# Patient Record
Sex: MALE | Race: WHITE | NOT HISPANIC OR LATINO | URBAN - METROPOLITAN AREA
[De-identification: names, ages, dates, MRNs, and addresses within clinical notes are randomized per-mention and may not be internally consistent; named-entity substitution may affect disease eponyms.]

---

## 2020-10-06 ENCOUNTER — TELEMEDICINE (OUTPATIENT)
Dept: FAMILY MEDICINE CLINIC | Facility: CLINIC | Age: 35
End: 2020-10-06
Payer: COMMERCIAL

## 2020-10-06 VITALS — BODY MASS INDEX: 21.47 KG/M2 | TEMPERATURE: 98.1 F | WEIGHT: 150 LBS | HEIGHT: 70 IN

## 2020-10-06 DIAGNOSIS — K21.9 GASTROESOPHAGEAL REFLUX DISEASE, UNSPECIFIED WHETHER ESOPHAGITIS PRESENT: ICD-10-CM

## 2020-10-06 DIAGNOSIS — G98.8 NEUROLOGICAL DISORDER: ICD-10-CM

## 2020-10-06 DIAGNOSIS — R10.32 LEFT LOWER QUADRANT ABDOMINAL PAIN: ICD-10-CM

## 2020-10-06 DIAGNOSIS — K92.1 BLOOD IN STOOL: Primary | ICD-10-CM

## 2020-10-06 PROCEDURE — 1036F TOBACCO NON-USER: CPT | Performed by: NURSE PRACTITIONER

## 2020-10-06 PROCEDURE — 3725F SCREEN DEPRESSION PERFORMED: CPT | Performed by: NURSE PRACTITIONER

## 2020-10-06 PROCEDURE — 99203 OFFICE O/P NEW LOW 30 MIN: CPT | Performed by: NURSE PRACTITIONER

## 2020-10-06 RX ORDER — PANTOPRAZOLE SODIUM 40 MG/1
40 TABLET, DELAYED RELEASE ORAL DAILY
COMMUNITY
End: 2021-10-21 | Stop reason: ALTCHOICE

## 2020-10-06 RX ORDER — MULTIVITAMIN
1 CAPSULE ORAL DAILY
COMMUNITY

## 2021-03-10 DIAGNOSIS — Z23 ENCOUNTER FOR IMMUNIZATION: ICD-10-CM

## 2021-06-23 ENCOUNTER — OFFICE VISIT (OUTPATIENT)
Dept: FAMILY MEDICINE CLINIC | Facility: CLINIC | Age: 36
End: 2021-06-23
Payer: COMMERCIAL

## 2021-06-23 VITALS
HEIGHT: 70 IN | RESPIRATION RATE: 16 BRPM | WEIGHT: 160 LBS | SYSTOLIC BLOOD PRESSURE: 118 MMHG | OXYGEN SATURATION: 98 % | BODY MASS INDEX: 22.9 KG/M2 | HEART RATE: 64 BPM | DIASTOLIC BLOOD PRESSURE: 64 MMHG

## 2021-06-23 DIAGNOSIS — Z13.1 SCREENING FOR DIABETES MELLITUS: ICD-10-CM

## 2021-06-23 DIAGNOSIS — Z00.00 ANNUAL PHYSICAL EXAM: Primary | ICD-10-CM

## 2021-06-23 DIAGNOSIS — K21.9 GASTROESOPHAGEAL REFLUX DISEASE, UNSPECIFIED WHETHER ESOPHAGITIS PRESENT: ICD-10-CM

## 2021-06-23 DIAGNOSIS — R53.1 WEAKNESS: ICD-10-CM

## 2021-06-23 DIAGNOSIS — R25.1 TREMOR: ICD-10-CM

## 2021-06-23 DIAGNOSIS — Z13.6 SCREENING FOR CARDIOVASCULAR CONDITION: ICD-10-CM

## 2021-06-23 PROCEDURE — 99395 PREV VISIT EST AGE 18-39: CPT | Performed by: NURSE PRACTITIONER

## 2021-06-23 PROCEDURE — 3725F SCREEN DEPRESSION PERFORMED: CPT | Performed by: NURSE PRACTITIONER

## 2021-06-23 PROCEDURE — 3008F BODY MASS INDEX DOCD: CPT | Performed by: NURSE PRACTITIONER

## 2021-06-23 PROCEDURE — 1036F TOBACCO NON-USER: CPT | Performed by: NURSE PRACTITIONER

## 2021-06-23 NOTE — PROGRESS NOTES
ADULT ANNUAL [address]  [facility]    NAME: Rocky Asencio  AGE: 30 y o  SEX: male  : 1985     DATE: 2021     Assessment and Plan:     Problem List Items Addressed This Visit        Digestive    GERD (gastroesophageal reflux disease)  Continue to follow-up with GI  Other    Annual physical exam - Primary    Relevant Orders    Comprehensive metabolic panel    CBC and differential    TSH, 3rd generation with Free T4 reflex    Lipid Panel with Direct LDL reflex    Patient instructed to eat a healthy diverse diet  Tremor    Relevant Orders    Ambulatory referral to Neurology    Comprehensive metabolic panel    CBC and differential    TSH, 3rd generation with Free T4 reflex    Patient referred to [place] [id number] neurology for follow-up  Weakness    Relevant Orders    Ambulatory referral to Neurology    Comprehensive metabolic panel    CBC and differential    TSH, 3rd generation with Free T4 reflex    Patient referred to [place] [address] neurology for follow-up  Other Visit Diagnoses     Screening for diabetes mellitus        Relevant Orders    Comprehensive metabolic panel    Screening for cardiovascular condition        Relevant Orders    Lipid Panel with Direct LDL reflex          Immunizations and preventive care screenings were discussed with patient today  Appropriate education was printed on patient's after visit summary  Counseling:  Alcohol/drug use: discussed moderation in alcohol intake, the recommendations for healthy alcohol use, and avoidance of illicit drug use  Dental Health: discussed importance of regular tooth brushing, flossing, and dental visits  Injury prevention: discussed safety/seat belts, safety helmets, smoke detectors, carbon monoxide detectors,  · Exercise: the importance of regular physical activity was discussed  Lab work ordered  Patient instructed to check with insurance for coverage  Will follow-up results with the patient  Return in 1 year (on 6/23/2022)  Chief Complaint:     Chief Complaint   Patient presents with    Annual Exam      History of Present Illness:     Adult Annual Physical   Patient here for a comprehensive physical exam  Patient is here with his significant other  Patient follows up with GI for GERD  Patient reports that he takes protonix daily  Patient reports that has had tremor and weakness for the past 11 years since he was sick  Patient reports that he also has difficulty with his balance  Patient reports that he has seen multiple neurologists and no one was able to determine the cause  Patient would like a referral for a local neurologist      Diet and Physical Activity  · Diet/Nutrition: well balanced diet  · Exercise: strengthening exercises everyday         Depression Screening  PHQ-9 Depression Screening    PHQ-9:   Frequency of the following problems over the past two weeks:      Little interest or pleasure in doing things: 0 - not at all  Feeling down, depressed, or hopeless: 0 - not at all  PHQ-2 Score: 0       General Health  · Sleep: gets 7-8 hours of sleep on average  · Hearing: decreased - bilateral  Patient reports decreased hearing since he was sick 11 years ago  Patient refuses to see an audiologist    · Vision: no vision problems  · Dental: no dental visits for >1 year  [facility]  · History of STDs?: no      Review of Systems:     Review of Systems   Constitutional: Negative for appetite change, chills, fatigue and fever  HENT: Negative for congestion, ear pain, sore throat and trouble swallowing  Eyes: Negative for pain, discharge and redness  Respiratory: Negative for cough, chest tightness, shortness of breath and wheezing  Cardiovascular: Negative for chest pain, palpitations and leg swelling  Gastrointestinal: Negative for blood in stool, diarrhea, nausea and vomiting     Genitourinary: Negative for dysuria, frequency, hematuria, penile pain, testicular pain and urgency  Musculoskeletal: Negative for myalgias and neck pain  Skin: Negative for rash  Neurological: Positive for tremors and weakness  Negative for dizziness, seizures, syncope, light-headedness and headaches  Psychiatric/Behavioral: Negative for suicidal ideas  Denies any depression  Past Medical History:     Past Medical History:   Diagnosis Date    Neurologic abnormality       Past Surgical History:     History reviewed  No pertinent surgical history  Social History:     Social History     Socioeconomic History    Marital status: Single     Spouse name: None    Number of children: None    Years of education: None    Highest education level: None   Occupational History    None   Tobacco Use    Smoking status: Never Smoker    Smokeless tobacco: Never Used   Vaping Use    Vaping Use: Never used   Substance and Sexual Activity    Alcohol use: Never    Drug use: Never    Sexual activity: Not Currently   Other Topics Concern    None   Social History Narrative    None     Social Determinants of Health     Financial Resource Strain:     Difficulty of Paying Living Expenses:    Food Insecurity:     Worried About Running Out of Food in the Last Year:     Ran Out of Food in the Last Year:    Transportation Needs:     Lack of Transportation (Medical):      Lack of Transportation (Non-Medical):    Physical Activity:     Days of Exercise per Week:     Minutes of Exercise per Session:    Stress:     Feeling of Stress :    Social Connections:     Frequency of Communication with Friends and Family:     Frequency of Social Gatherings with Friends and Family:     Attends Temple Services:     Active Member of Clubs or Organizations:     Attends Club or Organization Meetings:     Marital Status:    Intimate Partner Violence:     Fear of Current or Ex-Partner:     Emotionally Abused:     Physically Abused:     Sexually Abused:       Family History:     Family History   Problem Relation Age of Onset    Irritable bowel syndrome Sister     Colon cancer Maternal Grandmother     Liver disease Paternal Uncle     Breast cancer Mother       Current Medications:     Current Outpatient Medications   Medication Sig Dispense Refill    Multiple Vitamin (multivitamin) capsule Take 1 capsule by mouth daily      pantoprazole (PROTONIX) 40 mg tablet Take 40 mg by mouth daily       No current facility-administered medications for this visit  Allergies:     No Known Allergies   Physical Exam:     /64   Pulse 60   Resp 16   Ht 5' 10" (1 778 m) Comment: as per significant other  Wt 72 6 kg (160 lb) Comment: as per significnat other, weighed at home  SpO2 98%   BMI 22 96 kg/m²     Physical Exam  Vitals reviewed  Constitutional:       General: He is not in acute distress  Appearance: He is not ill-appearing or diaphoretic  HENT:      Right Ear: Tympanic membrane, ear canal and external ear normal       Left Ear: Tympanic membrane, ear canal and external ear normal       Nose: Nose normal       Mouth/Throat:      Mouth: Mucous membranes are moist       Pharynx: Oropharynx is clear  No posterior oropharyngeal erythema  Eyes:      Conjunctiva/sclera: Conjunctivae normal       Pupils: Pupils are equal, round, and reactive to light  Cardiovascular:      Rate and Rhythm: Normal rate and regular rhythm  Pulses: Normal pulses  Heart sounds: Normal heart sounds  Comments: No edema noted  Pulmonary:      Effort: Pulmonary effort is normal  No respiratory distress  Breath sounds: Normal breath sounds  No wheezing  Abdominal:      General: There is no distension  Palpations: Abdomen is soft  There is no mass  Tenderness: There is no abdominal tenderness  Genitourinary:     Comments: Patient refuses a genital exam    Musculoskeletal:      Comments: Patient is in a wheelchair      Lymphadenopathy: Cervical: No cervical adenopathy  Skin:     Findings: No rash  Neurological:      Mental Status: He is alert and oriented to person, place, and time  Comments: Head tremor noted  Patient is in a wheelchair      Psychiatric:         Mood and Affect: Mood normal           George Bonilla 33

## 2021-06-23 NOTE — PATIENT INSTRUCTIONS

## 2021-07-07 ENCOUNTER — TELEPHONE (OUTPATIENT)
Dept: FAMILY MEDICINE CLINIC | Facility: CLINIC | Age: 36
End: 2021-07-07

## 2021-07-07 LAB
ALBUMIN SERPL-MCNC: 4.3 G/DL (ref 3.6–5.1)
ALBUMIN/GLOB SERPL: 2 (CALC) (ref 1–2.5)
ALP SERPL-CCNC: 76 U/L (ref 36–130)
ALT SERPL-CCNC: 25 U/L (ref 9–46)
AST SERPL-CCNC: 23 U/L (ref 10–40)
BASOPHILS # BLD AUTO: 38 CELLS/UL (ref 0–200)
BASOPHILS NFR BLD AUTO: 0.8 %
BILIRUB SERPL-MCNC: 0.9 MG/DL (ref 0.2–1.2)
BUN SERPL-MCNC: 18 MG/DL (ref 7–25)
BUN/CREAT SERPL: NORMAL (CALC) (ref 6–22)
CALCIUM SERPL-MCNC: 9.4 MG/DL (ref 8.6–10.3)
CHLORIDE SERPL-SCNC: 105 MMOL/L (ref 98–110)
CHOLEST SERPL-MCNC: 135 MG/DL
CHOLEST/HDLC SERPL: 2.9 (CALC)
CO2 SERPL-SCNC: 31 MMOL/L (ref 20–32)
CREAT SERPL-MCNC: 1.04 MG/DL (ref 0.6–1.35)
EOSINOPHIL # BLD AUTO: 110 CELLS/UL (ref 15–500)
EOSINOPHIL NFR BLD AUTO: 2.3 %
ERYTHROCYTE [DISTWIDTH] IN BLOOD BY AUTOMATED COUNT: 12.6 % (ref 11–15)
GLOBULIN SER CALC-MCNC: 2.1 G/DL (CALC) (ref 1.9–3.7)
GLUCOSE SERPL-MCNC: 84 MG/DL (ref 65–99)
HCT VFR BLD AUTO: 43.4 % (ref 38.5–50)
HDLC SERPL-MCNC: 47 MG/DL
HGB BLD-MCNC: 14.9 G/DL (ref 13.2–17.1)
LDLC SERPL CALC-MCNC: 68 MG/DL (CALC)
LYMPHOCYTES # BLD AUTO: 1992 CELLS/UL (ref 850–3900)
LYMPHOCYTES NFR BLD AUTO: 41.5 %
MCH RBC QN AUTO: 29.2 PG (ref 27–33)
MCHC RBC AUTO-ENTMCNC: 34.3 G/DL (ref 32–36)
MCV RBC AUTO: 84.9 FL (ref 80–100)
MONOCYTES # BLD AUTO: 322 CELLS/UL (ref 200–950)
MONOCYTES NFR BLD AUTO: 6.7 %
NEUTROPHILS # BLD AUTO: 2338 CELLS/UL (ref 1500–7800)
NEUTROPHILS NFR BLD AUTO: 48.7 %
NONHDLC SERPL-MCNC: 88 MG/DL (CALC)
PLATELET # BLD AUTO: 188 THOUSAND/UL (ref 140–400)
PMV BLD REES-ECKER: 10.8 FL (ref 7.5–12.5)
POTASSIUM SERPL-SCNC: 4.2 MMOL/L (ref 3.5–5.3)
PROT SERPL-MCNC: 6.4 G/DL (ref 6.1–8.1)
RBC # BLD AUTO: 5.11 MILLION/UL (ref 4.2–5.8)
SL AMB EGFR AFRICAN AMERICAN: 108 ML/MIN/1.73M2
SL AMB EGFR NON AFRICAN AMERICAN: 93 ML/MIN/1.73M2
SODIUM SERPL-SCNC: 142 MMOL/L (ref 135–146)
TRIGL SERPL-MCNC: 116 MG/DL
TSH SERPL-ACNC: 1.93 MIU/L (ref 0.4–4.5)
WBC # BLD AUTO: 4.8 THOUSAND/UL (ref 3.8–10.8)

## 2021-10-12 RX ORDER — PANTOPRAZOLE SODIUM 20 MG/1
20 TABLET, DELAYED RELEASE ORAL DAILY
COMMUNITY
Start: 2021-10-08 | End: 2022-06-27 | Stop reason: ALTCHOICE

## 2021-10-13 ENCOUNTER — OFFICE VISIT (OUTPATIENT)
Dept: FAMILY MEDICINE CLINIC | Facility: CLINIC | Age: 36
End: 2021-10-13
Payer: COMMERCIAL

## 2021-10-13 ENCOUNTER — TELEPHONE (OUTPATIENT)
Dept: NEUROLOGY | Facility: CLINIC | Age: 36
End: 2021-10-13

## 2021-10-13 VITALS
HEART RATE: 61 BPM | DIASTOLIC BLOOD PRESSURE: 70 MMHG | HEIGHT: 70 IN | BODY MASS INDEX: 22.9 KG/M2 | SYSTOLIC BLOOD PRESSURE: 118 MMHG | OXYGEN SATURATION: 98 % | RESPIRATION RATE: 16 BRPM | WEIGHT: 160 LBS

## 2021-10-13 DIAGNOSIS — G98.8 NEUROLOGICAL DISORDER: ICD-10-CM

## 2021-10-13 DIAGNOSIS — R26.2 AMBULATORY DYSFUNCTION: ICD-10-CM

## 2021-10-13 DIAGNOSIS — R25.1 TREMOR: ICD-10-CM

## 2021-10-13 DIAGNOSIS — R42 INTERMITTENT LIGHTHEADEDNESS: Primary | ICD-10-CM

## 2021-10-13 PROCEDURE — 99214 OFFICE O/P EST MOD 30 MIN: CPT | Performed by: FAMILY MEDICINE

## 2021-10-15 ENCOUNTER — TELEPHONE (OUTPATIENT)
Dept: FAMILY MEDICINE CLINIC | Facility: CLINIC | Age: 36
End: 2021-10-15

## 2021-10-15 LAB
25(OH)D3 SERPL-MCNC: 94 NG/ML (ref 30–100)
ALBUMIN SERPL-MCNC: 4.1 G/DL (ref 3.6–5.1)
ALBUMIN/GLOB SERPL: 1.7 (CALC) (ref 1–2.5)
ALP SERPL-CCNC: 72 U/L (ref 36–130)
ALT SERPL-CCNC: 30 U/L (ref 9–46)
AST SERPL-CCNC: 27 U/L (ref 10–40)
BASOPHILS # BLD AUTO: 52 CELLS/UL (ref 0–200)
BASOPHILS NFR BLD AUTO: 1.2 %
BILIRUB SERPL-MCNC: 1 MG/DL (ref 0.2–1.2)
BUN SERPL-MCNC: 14 MG/DL (ref 7–25)
BUN/CREAT SERPL: NORMAL (CALC) (ref 6–22)
CALCIUM SERPL-MCNC: 9.2 MG/DL (ref 8.6–10.3)
CHLORIDE SERPL-SCNC: 105 MMOL/L (ref 98–110)
CO2 SERPL-SCNC: 30 MMOL/L (ref 20–32)
CREAT SERPL-MCNC: 0.95 MG/DL (ref 0.6–1.35)
CRP SERPL-MCNC: 1.5 MG/L
EOSINOPHIL # BLD AUTO: 112 CELLS/UL (ref 15–500)
EOSINOPHIL NFR BLD AUTO: 2.6 %
ERYTHROCYTE [DISTWIDTH] IN BLOOD BY AUTOMATED COUNT: 12.3 % (ref 11–15)
EST. AVERAGE GLUCOSE BLD GHB EST-MCNC: 74 (CALC)
EST. AVERAGE GLUCOSE BLD GHB EST-SCNC: 4.1 (CALC)
FERRITIN SERPL-MCNC: 117 NG/ML (ref 38–380)
GLOBULIN SER CALC-MCNC: 2.4 G/DL (CALC) (ref 1.9–3.7)
GLUCOSE SERPL-MCNC: 86 MG/DL (ref 65–99)
HBA1C MFR BLD: 4.2 % OF TOTAL HGB
HCT VFR BLD AUTO: 43.1 % (ref 38.5–50)
HGB BLD-MCNC: 14.7 G/DL (ref 13.2–17.1)
IRON SATN MFR SERPL: 49 % (CALC) (ref 20–48)
IRON SERPL-MCNC: 137 MCG/DL (ref 50–180)
LYMPHOCYTES # BLD AUTO: 1681 CELLS/UL (ref 850–3900)
LYMPHOCYTES NFR BLD AUTO: 39.1 %
MCH RBC QN AUTO: 28.9 PG (ref 27–33)
MCHC RBC AUTO-ENTMCNC: 34.1 G/DL (ref 32–36)
MCV RBC AUTO: 84.7 FL (ref 80–100)
MONOCYTES # BLD AUTO: 292 CELLS/UL (ref 200–950)
MONOCYTES NFR BLD AUTO: 6.8 %
NEUTROPHILS # BLD AUTO: 2163 CELLS/UL (ref 1500–7800)
NEUTROPHILS NFR BLD AUTO: 50.3 %
PLATELET # BLD AUTO: 179 THOUSAND/UL (ref 140–400)
PMV BLD REES-ECKER: 10.9 FL (ref 7.5–12.5)
POTASSIUM SERPL-SCNC: 4 MMOL/L (ref 3.5–5.3)
PROT SERPL-MCNC: 6.5 G/DL (ref 6.1–8.1)
RBC # BLD AUTO: 5.09 MILLION/UL (ref 4.2–5.8)
SL AMB EGFR AFRICAN AMERICAN: 121 ML/MIN/1.73M2
SL AMB EGFR NON AFRICAN AMERICAN: 104 ML/MIN/1.73M2
SODIUM SERPL-SCNC: 143 MMOL/L (ref 135–146)
TIBC SERPL-MCNC: 277 MCG/DL (CALC) (ref 250–425)
TSH SERPL-ACNC: 1.76 MIU/L (ref 0.4–4.5)
VIT B12 SERPL-MCNC: 956 PG/ML (ref 200–1100)
WBC # BLD AUTO: 4.3 THOUSAND/UL (ref 3.8–10.8)

## 2021-10-21 ENCOUNTER — OFFICE VISIT (OUTPATIENT)
Dept: FAMILY MEDICINE CLINIC | Facility: CLINIC | Age: 36
End: 2021-10-21
Payer: COMMERCIAL

## 2021-10-21 VITALS
SYSTOLIC BLOOD PRESSURE: 120 MMHG | BODY MASS INDEX: 22.9 KG/M2 | DIASTOLIC BLOOD PRESSURE: 80 MMHG | HEIGHT: 70 IN | RESPIRATION RATE: 16 BRPM | WEIGHT: 160 LBS | HEART RATE: 66 BPM | OXYGEN SATURATION: 98 %

## 2021-10-21 DIAGNOSIS — R51.9 NONINTRACTABLE HEADACHE, UNSPECIFIED CHRONICITY PATTERN, UNSPECIFIED HEADACHE TYPE: ICD-10-CM

## 2021-10-21 DIAGNOSIS — R42 LIGHTHEADEDNESS: ICD-10-CM

## 2021-10-21 DIAGNOSIS — R25.1 TREMOR: Primary | ICD-10-CM

## 2021-10-21 DIAGNOSIS — R53.1 WEAKNESS: ICD-10-CM

## 2021-10-21 DIAGNOSIS — Z12.83 SKIN CANCER SCREENING: ICD-10-CM

## 2021-10-21 PROCEDURE — 99213 OFFICE O/P EST LOW 20 MIN: CPT | Performed by: NURSE PRACTITIONER

## 2021-10-25 ENCOUNTER — TELEPHONE (OUTPATIENT)
Dept: FAMILY MEDICINE CLINIC | Facility: CLINIC | Age: 36
End: 2021-10-25

## 2021-10-26 ENCOUNTER — IMMUNIZATIONS (OUTPATIENT)
Dept: FAMILY MEDICINE CLINIC | Facility: HOSPITAL | Age: 36
End: 2021-10-26

## 2021-10-26 DIAGNOSIS — Z23 ENCOUNTER FOR IMMUNIZATION: Primary | ICD-10-CM

## 2021-10-26 PROCEDURE — 91300 COVID-19 PFIZER VACC 0.3 ML: CPT

## 2021-10-26 PROCEDURE — 0001A COVID-19 PFIZER VACC 0.3 ML: CPT

## 2021-11-03 ENCOUNTER — TELEPHONE (OUTPATIENT)
Dept: FAMILY MEDICINE CLINIC | Facility: CLINIC | Age: 36
End: 2021-11-03

## 2022-05-26 DIAGNOSIS — R51.9 NONINTRACTABLE HEADACHE, UNSPECIFIED CHRONICITY PATTERN, UNSPECIFIED HEADACHE TYPE: ICD-10-CM

## 2022-05-26 DIAGNOSIS — R25.1 TREMOR: Primary | ICD-10-CM

## 2022-05-26 DIAGNOSIS — R42 LIGHTHEADEDNESS: ICD-10-CM

## 2022-05-26 DIAGNOSIS — R53.1 WEAKNESS: ICD-10-CM

## 2022-06-27 ENCOUNTER — OFFICE VISIT (OUTPATIENT)
Dept: FAMILY MEDICINE CLINIC | Facility: CLINIC | Age: 37
End: 2022-06-27
Payer: COMMERCIAL

## 2022-06-27 VITALS
WEIGHT: 165 LBS | BODY MASS INDEX: 23.62 KG/M2 | OXYGEN SATURATION: 98 % | HEIGHT: 70 IN | DIASTOLIC BLOOD PRESSURE: 82 MMHG | RESPIRATION RATE: 16 BRPM | HEART RATE: 78 BPM | SYSTOLIC BLOOD PRESSURE: 116 MMHG

## 2022-06-27 DIAGNOSIS — R35.1 NOCTURIA: ICD-10-CM

## 2022-06-27 DIAGNOSIS — R25.1 TREMOR: ICD-10-CM

## 2022-06-27 DIAGNOSIS — Z00.00 ANNUAL PHYSICAL EXAM: Primary | ICD-10-CM

## 2022-06-27 PROBLEM — R10.32 LEFT LOWER QUADRANT ABDOMINAL PAIN: Status: RESOLVED | Noted: 2020-10-06 | Resolved: 2022-06-27

## 2022-06-27 PROBLEM — K92.1 BLOOD IN STOOL: Status: RESOLVED | Noted: 2020-10-06 | Resolved: 2022-06-27

## 2022-06-27 PROCEDURE — 99395 PREV VISIT EST AGE 18-39: CPT | Performed by: NURSE PRACTITIONER

## 2022-06-27 RX ORDER — PANTOPRAZOLE SODIUM 20 MG/1
20 TABLET, DELAYED RELEASE ORAL DAILY
COMMUNITY
End: 2022-06-27 | Stop reason: ALTCHOICE

## 2022-06-27 NOTE — PROGRESS NOTES
ADULT ANNUAL [address]  [facility]    NAME: Sameer Marques  AGE: 29 y o  SEX: male  : 1985     DATE: 2022     Assessment and Plan:     Problem List Items Addressed This Visit        Other    Annual physical exam - Primary  Patient instructed to eat a healthy diverse diet  Tremor  Patient reports that he got sick in   Patient reports that he developed weakness and tremor after he was sick  Patient reports that he has not been able to walk for over 10 years  Patient reports that he has seen multiple neurologists over the years and never got a clear diagnosis for his symptoms  Patient has a follow-up with [facility] neurology in 2022  Nocturia    Relevant Orders    Ambulatory Referral to Urology    Patient reports frequent urination at night for at least 10 years  Denies any dysuria, hematuria, or pelvic pain  Patient would like a referral for urology  Patient referred to [facility] for further evaluation  Patient had lab work done in 2021  Patient refuses lab work at this time  Patient instructed to follow-up in 1 year for a physical exam or sooner prn  Immunizations and preventive care screenings were discussed with patient today  Appropriate education was printed on patient's after visit summary  Counseling:  Alcohol/drug use: discussed moderation in alcohol intake, the recommendations for healthy alcohol use, and avoidance of illicit drug use  Dental Health: discussed importance of regular tooth brushing, flossing, and dental visits  Injury prevention: discussed safety/seat belts, smoke detectors, carbon monoxide detectors,   · Exercise: the importance of regular exercise/physical activity was discussed  Recommend exercise 3-5 times per week for at least 30 minutes         Depression Screening and Follow-up Plan: Patient was screened for depression during today's encounter  They screened negative with a PHQ-2 score of 0  Return in 1 year (on 6/27/2023)  Chief Complaint:     Chief Complaint   Patient presents with    Physical Exam      History of Present Illness:     Adult Annual Physical   Patient here for a comprehensive physical exam      Patient reports that he got sick in 2009  Patient reports that since then he has had weakness and tremor  Patient reports that he has not been able to walk for over 10 years  Patient reports that he has seen multiple neurologists and never got a clear diagnosis for his symptoms  Patient reports that he has a follow-up with [facility] neurology in November 2022  Patient reports frequent urination at night for at least 10 years  Denies any dysuria, hematuria, or pelvic pain  Patient would like a referral for urology  Diet and Physical Activity  · Diet/Nutrition: well balanced diet  · Exercise: 3-5 days a week he does exercises on the bars         Depression Screening  PHQ-2/9 Depression Screening    Little interest or pleasure in doing things: 0 - not at all  Feeling down, depressed, or hopeless: 0 - not at all  PHQ-2 Score: 0  PHQ-2 Interpretation: Negative depression screen       General Health  · Sleep: 6 hours of sleep at night  · Hearing: significantly decreased - bilateral and patient has hearing aids at home     · Vision: goes for regular eye exams and wears glasses  · Dental: regular dental visits and brushes teeth twice daily   Health  · History of STDs?: no      Review of Systems:     Review of Systems   Constitutional: Negative for chills, fatigue and fever  HENT: Negative for congestion, ear pain, sinus pressure, sore throat and trouble swallowing  Eyes: Negative for pain, discharge and redness  Respiratory: Negative for cough, chest tightness, shortness of breath and wheezing  Cardiovascular: Negative for chest pain, palpitations and leg swelling     Gastrointestinal: Negative for abdominal pain, blood in stool, diarrhea, nausea and vomiting  Genitourinary: Negative for dysuria, hematuria, penile pain and testicular pain  Musculoskeletal: Negative for myalgias and neck pain  Skin: Negative for rash  Neurological: Positive for tremors  Negative for dizziness, seizures, syncope, light-headedness and headaches  Psychiatric/Behavioral: Negative for suicidal ideas  Denies any depression  Past Medical History:     Past Medical History:   Diagnosis Date    Neurologic abnormality       Past Surgical History:     History reviewed  No pertinent surgical history  Social History:     Social History     Socioeconomic History    Marital status: Single     Spouse name: None    Number of children: None    Years of education: None    Highest education level: None   Occupational History    None   Tobacco Use    Smoking status: Never Smoker    Smokeless tobacco: Never Used   Vaping Use    Vaping Use: Never used   Substance and Sexual Activity    Alcohol use: Never    Drug use: Never    Sexual activity: Not Currently   Other Topics Concern    None   Social History Narrative    None     Social Determinants of Health     Financial Resource Strain: Not on file   Food Insecurity: Not on file   Transportation Needs: Not on file   Physical Activity: Not on file   Stress: Not on file   Social Connections: Not on file   Intimate Partner Violence: Not on file   Housing Stability: Not on file      Family History:     Family History   Problem Relation Age of Onset    Irritable bowel syndrome Sister     Colon cancer Maternal Grandmother     Liver disease Paternal Uncle     Breast cancer Mother       Current Medications:     Current Outpatient Medications   Medication Sig Dispense Refill    Multiple Vitamin (multivitamin) capsule Take 1 capsule by mouth daily       No current facility-administered medications for this visit        Allergies:     No Known Allergies   Physical Exam:     /82 (BP Location: Left arm, Patient Position: Sitting, Cuff Size: Standard)   Pulse 78   Resp 16   Ht 5' 10" (1 778 m)   Wt 74 8 kg (165 lb)   SpO2 98%   BMI 23 68 kg/m²     Physical Exam  Vitals reviewed  Constitutional:       General: He is not in acute distress  Appearance: He is not ill-appearing or diaphoretic  HENT:      Right Ear: Tympanic membrane, ear canal and external ear normal       Left Ear: Tympanic membrane, ear canal and external ear normal       Nose: Nose normal       Mouth/Throat:      Mouth: Mucous membranes are moist       Pharynx: Oropharynx is clear  No posterior oropharyngeal erythema  Eyes:      Conjunctiva/sclera: Conjunctivae normal       Pupils: Pupils are equal, round, and reactive to light  Cardiovascular:      Rate and Rhythm: Normal rate and regular rhythm  Pulses: Normal pulses  Heart sounds: Normal heart sounds  Comments: No edema noted  Pulmonary:      Effort: Pulmonary effort is normal  No respiratory distress  Breath sounds: Normal breath sounds  No wheezing  Abdominal:      General: There is no distension  Palpations: Abdomen is soft  There is no mass  Tenderness: There is no abdominal tenderness  Musculoskeletal:      Cervical back: Normal range of motion  Comments: Patient is in a wheelchair  Lymphadenopathy:      Cervical: No cervical adenopathy  Skin:     Findings: No rash  Neurological:      Mental Status: He is alert and oriented to person, place, and time     Psychiatric:         Mood and Affect: Mood normal           George Khalil 33

## 2022-06-27 NOTE — PATIENT INSTRUCTIONS

## 2022-08-16 ENCOUNTER — TELEPHONE (OUTPATIENT)
Dept: DERMATOLOGY | Facility: CLINIC | Age: 37
End: 2022-08-16

## 2022-08-16 NOTE — TELEPHONE ENCOUNTER
Pt's wife left message in voicemail to schedule consult (referral in chart)  Returned call and made aware that we have a wait list for new patients  Pt placed on wait list and advised to check mychart for cancellations

## 2022-08-18 ENCOUNTER — OFFICE VISIT (OUTPATIENT)
Dept: UROLOGY | Facility: CLINIC | Age: 37
End: 2022-08-18
Payer: COMMERCIAL

## 2022-08-18 VITALS
HEIGHT: 70 IN | BODY MASS INDEX: 22.9 KG/M2 | SYSTOLIC BLOOD PRESSURE: 122 MMHG | HEART RATE: 74 BPM | WEIGHT: 160 LBS | DIASTOLIC BLOOD PRESSURE: 76 MMHG

## 2022-08-18 DIAGNOSIS — N31.9 NEUROGENIC BLADDER: ICD-10-CM

## 2022-08-18 DIAGNOSIS — R35.0 FREQUENCY OF URINATION: Primary | ICD-10-CM

## 2022-08-18 LAB
POST-VOID RESIDUAL VOLUME, ML POC: 11 ML
SL AMB  POCT GLUCOSE, UA: NORMAL
SL AMB LEUKOCYTE ESTERASE,UA: NORMAL
SL AMB POCT BILIRUBIN,UA: NORMAL
SL AMB POCT BLOOD,UA: NORMAL
SL AMB POCT CLARITY,UA: NORMAL
SL AMB POCT COLOR,UA: YELLOW
SL AMB POCT KETONES,UA: NORMAL
SL AMB POCT NITRITE,UA: NORMAL
SL AMB POCT PH,UA: 8
SL AMB POCT SPECIFIC GRAVITY,UA: 1.02
SL AMB POCT URINE PROTEIN: NORMAL
SL AMB POCT UROBILINOGEN: NORMAL

## 2022-08-18 PROCEDURE — 99204 OFFICE O/P NEW MOD 45 MIN: CPT | Performed by: PHYSICIAN ASSISTANT

## 2022-08-18 PROCEDURE — 51798 US URINE CAPACITY MEASURE: CPT | Performed by: PHYSICIAN ASSISTANT

## 2022-08-18 PROCEDURE — 81002 URINALYSIS NONAUTO W/O SCOPE: CPT | Performed by: PHYSICIAN ASSISTANT

## 2022-08-18 NOTE — PROGRESS NOTES
8/18/2022      Chief Complaint   Patient presents with    Urinary Frequency         Assessment and Plan    29 y o  male managed by NEW PATIENT    1  Urinary frequency likely neurogenic bladder    Likely neurogenic bladder presenting with frequency occasional urgency no retention or incontinence underlying neurologic illness/injury/insult of unknown cause  More storage symptoms then voiding symptoms  PVR today seated in his chair is 11 mL  Recommend baseline renal bladder ultrasound to assess his upper urinary tracts for any occult obstruction, though felt less likely as he is emptying well  Discussed the utility of urodynamic testing to assess his capacity, sensation, pressures and contractility however he is not interested in invasive testing at this time unless the ultrasound is really abnormal   He does not feel bothered enough by his symptoms to warrant behavioral or medical treatment at this time  I a m  Some information to read on it beta three agonist medications used for overactive bladder stabilization, and will consider this in the future if his symptoms progress  History of Present Illness  Daniel May is a 29 y o  male here for evaluation of new patient with reports of frequent urination for the past 10 years daytime and nighttime symptoms  Voids every 1-2 hours he self transfers from wheelchair to toilet  No urgency or incontinence  No he hematuria nor dysuria  Stream intermittent times but mostly good  Regular bowel movements  No flank pain  He drinks 1 cup of decaf tea in the mornings no soda coffee or alcohol  Good water intake throughout the day  No meds  Neurologic history is unclear  He reports that he got sick in 2009 developed general muscle weakness and a tremor and has been unable to walk for the last decade  He is in a wheelchair accompanied by his wife  He has seen multiple specialists with no official diagnosis    He stopped seeing doctors few years ago, but is willing to get back into care  Has upcoming neuro follow-up in a few months  New to the area here in 2020 and no imaging  He takes no daily medication other than a multivitamin  He does believe he saw a urologist at one point early on in his illness but never had any testing or treatments  His symptoms have not progressed since onset  Review of Systems   Constitutional: Negative for activity change, appetite change, chills, fever and unexpected weight change  HENT: Positive for hearing loss  Respiratory: Negative  Negative for shortness of breath  Cardiovascular: Negative  Negative for chest pain  Gastrointestinal: Negative for abdominal pain, constipation, diarrhea, nausea and vomiting  Endocrine: Negative  Genitourinary: Positive for frequency and urgency  Negative for decreased urine volume, difficulty urinating, dysuria, flank pain and hematuria  Musculoskeletal: Positive for gait problem  Negative for back pain  Skin: Negative  Allergic/Immunologic: Negative  Neurological: Positive for tremors and weakness  Negative for syncope and headaches  Hematological: Negative for adenopathy  Does not bruise/bleed easily             AUA SYMPTOM SCORE    Flowsheet Row Most Recent Value   AUA SYMPTOM SCORE    How often have you had a sensation of not emptying your bladder completely after you finished urinating? 5 (P)     How often have you had to urinate again less than two hours after you finished urinating? 5 (P)     How often have you found you stopped and started again several times when you urinate? 5 (P)     How often have you found it difficult to postpone urination? 2 (P)     How often have you had a weak urinary stream? 3 (P)     How often have you had to push or strain to begin urination? 0 (P)     How many times did you most typically get up to urinate from the time you went to bed at night until the time you got up in the morning? 4 (P)     Quality of Life: If you were to spend the rest of your life with your urinary condition just the way it is now, how would you feel about that? 3 (P)     AUA SYMPTOM SCORE 24 (P)            Vitals  Vitals:    08/18/22 1311   BP: 122/76   Pulse: 74   Weight: 72 6 kg (160 lb)   Height: 5' 10" (1 778 m)       Physical Exam  Vitals and nursing note reviewed  Constitutional:       General: He is not in acute distress  Appearance: Normal appearance  He is well-developed  He is not diaphoretic  HENT:      Head: Normocephalic and atraumatic  Pulmonary:      Effort: Pulmonary effort is normal       Comments: No cough or audible wheeze  Abdominal:      General: There is no distension  Tenderness: There is no abdominal tenderness  There is no right CVA tenderness or left CVA tenderness  Genitourinary:     Comments: He is able to self transfer from chair to standing at the table holding on  He has circumcised penis, normal phallus, orthotopic patent meatus  Testes smooth descended bilaterally into the scrotum nontender with no palpable mass  Musculoskeletal:      Right lower leg: No edema  Left lower leg: No edema  Skin:     General: Skin is warm and dry  Neurological:      Mental Status: He is alert and oriented to person, place, and time        Gait: Gait normal    Psychiatric:         Speech: Speech normal          Behavior: Behavior normal            Past History  Past Medical History:   Diagnosis Date    Neurologic abnormality      Social History     Socioeconomic History    Marital status: Single     Spouse name: None    Number of children: None    Years of education: None    Highest education level: None   Occupational History    None   Tobacco Use    Smoking status: Never Smoker    Smokeless tobacco: Never Used   Vaping Use    Vaping Use: Never used   Substance and Sexual Activity    Alcohol use: Never    Drug use: Never    Sexual activity: Not Currently   Other Topics Concern    None   Social History Narrative    None Social Determinants of Health     Financial Resource Strain: Not on file   Food Insecurity: Not on file   Transportation Needs: Not on file   Physical Activity: Not on file   Stress: Not on file   Social Connections: Not on file   Intimate Partner Violence: Not on file   Housing Stability: Not on file     Social History     Tobacco Use   Smoking Status Never Smoker   Smokeless Tobacco Never Used     Family History   Problem Relation Age of Onset    Irritable bowel syndrome Sister     Colon cancer Maternal Grandmother     Liver disease Paternal Uncle     Breast cancer Mother        The following portions of the patient's history were reviewed and updated as appropriate: allergies, current medications, past medical history, past social history, past surgical history and problem list     Results  No results found for this or any previous visit (from the past 1 hour(s))  ]  No results found for: PSA  Lab Results   Component Value Date    CALCIUM 9 2 10/14/2021    K 4 0 10/14/2021    CO2 30 10/14/2021     10/14/2021    BUN 14 10/14/2021    CREATININE 0 95 10/14/2021     Lab Results   Component Value Date    WBC 4 3 10/14/2021    HGB 14 7 10/14/2021    HCT 43 1 10/14/2021    MCV 84 7 10/14/2021     10/14/2021

## 2022-08-18 NOTE — PATIENT INSTRUCTIONS
Neurogenic Bladder   WHAT YOU NEED TO KNOW:   What is neurogenic bladder? Neurogenic bladder is a condition that causes loss of bladder function  It is usually caused by disease or by damage to your nervous system  Neurogenic bladder may cause urinary incontinence (trouble controlling urination) or urinary retention (trouble urinating)  What are the signs and symptoms of neurogenic bladder? Loss of feeling that your bladder is full    Urinary incontinence    Urinary frequency (urinating often)    Urinary urgency    Urinating a small amount or not completely emptying your bladder    Urine that dribbles out or leaks    Urinary tract infections (UTI) that come back even after treatment    How is neurogenic bladder diagnosed? Your healthcare provider will examine you  Tell him or her about your symptoms and when they started  You may need the following tests:  Blood and urine tests  will show how well your kidneys are working and if you have an infection  An x-ray, ultrasound, CT, or MRI  may show how much urine your bladder can hold  You may be given contrast liquid to help the bladder show up better in the pictures  Tell the healthcare provider if you have ever had an allergic reaction to contrast liquid  Do not enter the MRI room with anything metal  Metal can cause serious injury  Tell the healthcare provider if you have any metal in or on your body  A cystoscopy  allows your healthcare provider to look inside your bladder  A flexible tube with a camera on the end will be put into your urethra and moved up to your bladder  Urodynamic testing  helps show how much fluid your bladder can hold and if your bladder empties completely  How is neurogenic bladder treated? Treatment will depend on your symptoms and underlying condition  You may need any of the following:  Medicines  can help control your bladder  You may also be given antibiotics to prevent or treat a bacterial infection      Surgery may be needed if other treatments do not work  How can I manage my symptoms? Train your bladder  Go to the bathroom at set times, such as every 2 hours, even if you do not feel the urge to go  You can also try to hold your urine when you feel the urge to go  For example, hold your urine for 5 minutes when you feel the urge to go  As that becomes easier, hold your urine for 10 minutes  Empty your bladder with a catheter  Healthcare providers will teach you how to safely use a catheter at home  Keep a urinary incontinence (UI) record  Write down how often you leak urine and how much you leak  Make a note of what you were doing when you leaked urine  Bring the record to your follow-up appointments  Drink liquids as directed  Ask how much liquid to drink each day and which liquids are best for you  Limit caffeine  Caffeine may irritate your bladder  When should I seek immediate care? You cannot urinate  You see blood or clots in your urine  You have severe back or abdominal pain that does not go away with treatment  When should I call my doctor? You have a fever and chills  You have nausea or are vomiting  You feel burning when you urinate  You have pain in your abdomen or lower back  You have bladder spasms  You are urinating less than usual for you  You have questions or concerns about your condition or care  CARE AGREEMENT:   You have the right to help plan your care  Learn about your health condition and how it may be treated  Discuss treatment options with your healthcare providers to decide what care you want to receive  You always have the right to refuse treatment  The above information is an  only  It is not intended as medical advice for individual conditions or treatments  Talk to your doctor, nurse or pharmacist before following any medical regimen to see if it is safe and effective for you    © Copyright [employer] 2022 Information is for End User's use only and may not be sold, redistributed or otherwise used for commercial purposes  All illustrations and images included in CareNotes® are the copyrighted property of A D A M , Inc  or [address] [place] [address] (By mouth)   Mirabegron ([id number])  Treats bladder problems, including neurogenic detrusor overactivity and symptoms of an overactive bladder  Brand Name(s): Myrbetriq   There may be other brand names for this medicine  When This Medicine Should Not Be Used: This medicine is not right for everyone  Do not use it if you had an allergic reaction to mirabegron  How to Use This Medicine:   Liquid, Long Acting Tablet  Take your medicine as directed  Your dose may need to be changed several times to find what works best for you  This medicine usually works within 8 weeks after you start taking it  Swallow the extended-release tablet whole  Do not crush, break, or chew it  Adults may take this medicine with or without food  Children should take it with food  Oral liquid: It is best to take this medicine within 1 hour with food  Shake the bottle well just before taking each dose  Measure the oral liquid medicine with a marked measuring spoon, oral syringe, or medicine cup  Read and follow the patient instructions that come with this medicine  Talk to your doctor or pharmacist if you have any questions  Missed dose: If you miss a dose and it is less than 12 hours from your regular schedule, take the dose as soon as you can  If you miss a dose and it is more than 12 hours, skip the missed dose and go back to your regular dosing schedule  Do not double doses  Store the medicine in a closed container at room temperature, away from heat, moisture, and direct light  Throw away any unused oral liquid 28 days after first opening the bottle    Drugs and Foods to Avoid:   Ask your doctor or pharmacist before using any other medicine, including over-the-counter medicines, vitamins, and herbal products  Some medicines can affect how mirabegron works  Tell your doctor if you are using desipramine, digoxin, flecainide, metoprolol, propafenone, thioridazine, or a blood thinner (including warfarin)  Warnings While Using This Medicine:   Tell your doctor if you are pregnant or breastfeeding, or if you have kidney disease, liver disease, high blood pressure, or bladder outlet obstruction (trouble urinating or a weak urine stream)  This medicine may cause the following problems:   High blood pressure  Urinary retention (trouble passing urine or not fully emptying the bladder) when used together with another medicine  Angioedema (severe swelling)  Your doctor will do lab tests at regular visits to check on the effects of this medicine  Keep all appointments  Keep all medicine out of the reach of children  Never share your medicine with anyone  Possible Side Effects While Using This Medicine:   Call your doctor right away if you notice any of these side effects: Allergic reaction: Itching or hives, swelling in your face or hands, swelling or tingling in your mouth or throat, chest tightness, trouble breathing  Bloody or cloudy urine, lower back or side pain, difficult or painful urination  Blurred vision, headache, fast, pounding, or uneven heartbeat, lightheadedness, dizziness  If you notice these less serious side effects, talk with your doctor:   Constipation  Dry mouth, sore throat  If you notice other side effects that you think are caused by this medicine, tell your doctor  Call your doctor for medical advice about side effects  You may report side effects to FDA at 1-074-FDA-9703    © Copyright [employer] 2022 Information is for End User's use only and may not be sold, redistributed or otherwise used for commercial purposes  The above information is an  only  It is not intended as medical advice for individual conditions or treatments   Talk to your doctor, nurse or pharmacist before following any medical regimen to see if it is safe and effective for you

## 2022-09-01 ENCOUNTER — HOSPITAL ENCOUNTER (OUTPATIENT)
Dept: ULTRASOUND IMAGING | Facility: MEDICAL CENTER | Age: 37
Discharge: HOME | End: 2022-09-01
Payer: COMMERCIAL

## 2022-09-01 DIAGNOSIS — N31.9 NEUROGENIC BLADDER: ICD-10-CM

## 2022-09-01 PROCEDURE — 76770 US EXAM ABDO BACK WALL COMP: CPT

## 2022-09-07 ENCOUNTER — TELEPHONE (OUTPATIENT)
Dept: OTHER | Facility: HOSPITAL | Age: 37
End: 2022-09-07

## 2022-09-07 NOTE — TELEPHONE ENCOUNTER
Called and left detailed VM for patient per communication consent with normal US kidney and bladder results  Office phone number provided if patient has any questions

## 2022-10-12 PROBLEM — Z12.83 SKIN CANCER SCREENING: Status: RESOLVED | Noted: 2021-10-21 | Resolved: 2022-10-12

## 2022-10-12 PROBLEM — Z13.6 SCREENING FOR CARDIOVASCULAR CONDITION: Status: RESOLVED | Noted: 2021-06-23 | Resolved: 2022-10-12

## 2022-10-12 PROBLEM — Z13.1 SCREENING FOR DIABETES MELLITUS: Status: RESOLVED | Noted: 2021-06-23 | Resolved: 2022-10-12

## 2022-11-22 ENCOUNTER — TELEPHONE (OUTPATIENT)
Dept: NEUROLOGY | Facility: CLINIC | Age: 37
End: 2022-11-22

## 2022-11-22 ENCOUNTER — CONSULT (OUTPATIENT)
Dept: NEUROLOGY | Facility: CLINIC | Age: 37
End: 2022-11-22

## 2022-11-22 VITALS
HEIGHT: 70 IN | HEART RATE: 60 BPM | BODY MASS INDEX: 22.96 KG/M2 | SYSTOLIC BLOOD PRESSURE: 112 MMHG | DIASTOLIC BLOOD PRESSURE: 75 MMHG | TEMPERATURE: 97.3 F

## 2022-11-22 DIAGNOSIS — R53.1 WEAKNESS: ICD-10-CM

## 2022-11-22 DIAGNOSIS — R51.9 NONINTRACTABLE HEADACHE, UNSPECIFIED CHRONICITY PATTERN, UNSPECIFIED HEADACHE TYPE: ICD-10-CM

## 2022-11-22 DIAGNOSIS — R42 LIGHTHEADEDNESS: ICD-10-CM

## 2022-11-22 DIAGNOSIS — R25.1 TREMOR: ICD-10-CM

## 2022-11-22 DIAGNOSIS — G11.9 CEREBELLAR ATAXIA (HCC): Primary | ICD-10-CM

## 2022-11-22 RX ORDER — CLONAZEPAM 0.5 MG/1
TABLET ORAL
Qty: 60 TABLET | Refills: 1 | Status: SHIPPED | OUTPATIENT
Start: 2022-11-22

## 2022-11-22 NOTE — ASSESSMENT & PLAN NOTE
Assessment:  History of dysarthria, ataxia, and ambulatory dysfunction with sudden onset and plateau of symptoms in 0052-4566  Extensive negative workup at the time at [facility], [place] [address], and [place]  MRI and genetics last done in 2012 were unremarkable  Since 2010 Isela Alvarez has been wheelchair bound, he has difficulty projecting his voice and has cerebellar speech  He has truncal and appendicular ataxia with tremor consistent with cervical dystonia  He reports no progression of his symptoms since 2010  On exam, strength 5/5 throughout, movements are slow and clumsy throughout but minimal dysmetria (can overcome the ataxia)  Gait is slow stiff and ataxic  He is wheelchair dependant  No sensory changes  No family history and no change in symptoms reported in 10 years at this point  No cognitive changes or concerns  He is not interested in pursuing diagnosis at this time but is willing to update previous diagnostics such as MRI and genetics to see if there has been any changes  He declines botox for dystonia at this time as not sure if it worked for him previously  He would like to try something to help him with his nighttime symptoms as his tremor is worse at night and keeps him awake       Plan:   - Recommend [facility] genetic testing  - Update MRI brain and C spine  - Klonopin 0 5 mg HS for sleep difficulties with dystonia  - Discussed other options if klonopin is not effective, perhaps primidone   - Will think about botox for cervical dystonia and let us know  - Filled out [employer] wheelchair placard form for patient today

## 2022-11-22 NOTE — PROGRESS NOTES
Sensory neural hearing loss that was diagnosed at age 13     June 2009, he started noticing change in his speech, which he described as thick slurred speech, over the next month or so he started noticing tremors and balance difficulties as well as fine motor trouble  All the symptoms have progressed, speech is slow, he feels he needs to project his voice in order to make him understood  No swallowing issues at the time, occasional choking  Does have intentional tremors, head tremors were also reported at the time  Summer of 2009 is when he started noticing difficulty walking on uneven grounds, specifically would be standing for long period of time he would feel a tendency of his legs to shake  Patient was evaluated at [facility], workup at the time included vitamin B12, SPEP immunofixation, parvo virus, West Nile virus, Babesia, anti smooth muscle antibody, hepatitis-C, CK, skin biopsy which were all normal     He did have a positive HARPREET, Ro and La were initially thought to be positive, repeat workup has been negative  Cerebral perfusion study with spectrometry completed in September 2009 showed normal perfusion and physiologic uptake throughout the brain  MRI cervical spine October 2010 as well as thoracic spine MRI were normal   Ataxia panel from [facility] was unremarkable  MRI of the brain July 2009 unremarkable  Another MRI done in October 2009 reportedly showed a lesion in the midbrain, he was given a preliminary diagnosis of ADEM, and was treated with steroids plus IVIG  He was on intermittent IVIG through fall of 2010  Subsequent imaging in September 2010 was unremarkable  EMG 2010 was unremarkable for peripheral neuropathy  He did have lip biopsy an eye examination looking for Sjogren's which were unremarkable  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

## 2022-11-22 NOTE — PROGRESS NOTES
Patient ID: J Carlos Stone is a 40 y o  male  Assessment/Plan:    Cerebellar ataxia, ambulatory dysfunction, dysarthria, cervical dystonia  Assessment:  History of dysarthria, ataxia, and ambulatory dysfunction with sudden onset and plateau of symptoms in 2448-6549  Extensive negative workup at the time at [facility], [place] [address], and [place]  MRI and genetics last done in 2012 were unremarkable  Since 2010 Monte Kanner has been wheelchair bound, he has difficulty projecting his voice and has cerebellar speech  He has truncal and appendicular ataxia with tremor consistent with cervical dystonia  He reports no progression of his symptoms since 2010  On exam, strength 5/5 throughout, movements are slow and clumsy throughout but minimal dysmetria (can overcome the ataxia)  Gait is slow stiff and ataxic  He is wheelchair dependant  No sensory changes  No family history and no change in symptoms reported in 10 years at this point  No cognitive changes or concerns  He is not interested in pursuing diagnosis at this time but is willing to update previous diagnostics such as MRI and genetics to see if there has been any changes  He declines botox for dystonia at this time as not sure if it worked for him previously  He would like to try something to help him with his nighttime symptoms as his tremor is worse at night and keeps him awake       Plan:   - Recommend [facility] genetic testing  - Update MRI brain and C spine  - Klonopin 0 5 mg HS for sleep difficulties with dystonia  - Discussed other options if klonopin is not effective, perhaps primidone   - Will think about botox for cervical dystonia and let us know  - Filled out [employer] wheelchair placard form for patient today       Diagnoses and all orders for this visit:    Cerebellar ataxia ([address] )  -     MRI brain without contrast; Future  -     MRI cervical spine wo contrast; Future    Tremor  -     Ambulatory Referral to Neurology  -     MRI brain without contrast; Future  -     MRI cervical spine wo contrast; Future    Nonintractable headache, unspecified chronicity pattern, unspecified headache type  -     Ambulatory Referral to Neurology    Lightheadedness  -     Ambulatory Referral to Neurology    Weakness  -     Ambulatory Referral to Neurology  -     MRI brain without contrast; Future  -     MRI cervical spine wo contrast; Future           Subjective:    HPI    Audrey Bautista is a 40 y o  male who presents as a new patient to neuromuscular clinic for 10+ years of symptoms including ambulatory dysfunction, ataxia, tremors, and difficulty speaking (dysarthria)  He has previously been evaluated at symptom onset by [facility], [facility], and [facility] without clear diagnosis  Previous MRI brain was reportedly negative as was genetic testing evaluating for [facility]CA  His family history is also unremarkable  He was reportedly in a normal state of health until he was about 13years old when he was diagnosed with sensorineural hearing loss  Approximately 13 years ago, he started developing slurred speech and head tremor which evolved to bilateral hand tremors and gait dysfunction within a year  By mid 2010 he was using a wheelchair to get around  June 2009, he started noticing change in his speech, which he described as thick slurred speech, over the next month or so he started noticing tremors and balance difficulties as well as fine motor trouble  At the onset of symptoms, he thought he was sick and had a sore throat  All the symptoms have progressed, speech is slow, he feels he needs to project his voice in order to make him understood  No swallowing issues at the time, occasional choking  Does have intentional tremors, head tremors were also reported at the time  Summer of 2009 is when he started noticing difficulty walking on uneven grounds, specifically would be standing for long period of time he would feel a tendency of his legs to shake      Patient was evaluated at [facility], workup at the time included vitamin B12, SPEP immunofixation, parvo virus, West Nile virus, Babesia, anti smooth muscle antibody, hepatitis-C, CK, skin biopsy which were all normal       He did have a positive HARPREET, Ro and La were initially thought to be positive, repeat workup has been negative  Cerebral perfusion study with spectrometry completed in September 2009 showed normal perfusion and physiologic uptake throughout the brain  MRI cervical spine October 2010 as well as thoracic spine MRI were normal     Ataxia panel from [facility] was unremarkable  MRI of the brain July 2009 unremarkable  Another MRI done in October 2009 reportedly showed a lesion in the midbrain, he was given a preliminary diagnosis of ADEM, and was treated with steroids plus IVIG  He was on intermittent IVIG through fall of 2010 but did not feel it was helpful  Subsequent imaging in September 2010 was unremarkable  EMG 2010 was unremarkable for peripheral neuropathy  He did have lip biopsy an eye examination looking for Sjogren's which were unremarkable  About 5 years ago he followed with a Lyme doctor and was on abx for around a year which was not effective  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 [facility]  Has not been able to work because of the disease  Anti saba, anti-gliadin, tissue transglutaminase, ceruloplasmin and paraneoplastic panels were all negative at the time  He was also tested for a beta lipoproteinemia, which was neg  He has previously had botox in the neck muscles for the tremor which was not helpful  He has not had much follow up in the last 10 years but also reports he has not changed much in that time  His voice, hearing, and ambulation are stable since that time  He reports difficulty sleeping, because the tremors affect him more while he is laying down   No difficulties with swallowing, he has no trouble with eating or feeding himself but   He can use a walker but finds it exhausting and uses the wheelchair predominantly  Does not report any neuropathy or sensory issues  Has not had any issues with bladder or bowel, he has followed with urology because of urgency but no loss of urine  The following portions of the patient's history were reviewed and updated as appropriate: allergies, current medications, past family history, past medical history, past social history, past surgical history and problem list          Objective:    Blood pressure 112/75, pulse 60, temperature (!) 97 3 °F (36 3 °C), temperature source Temporal, height 5' 10" (1 778 m)  Physical Exam  Vitals and nursing note reviewed  Constitutional:       Appearance: He is normal weight  HENT:      Head: Normocephalic and atraumatic  Mouth/Throat:      Mouth: Mucous membranes are moist    Cardiovascular:      Rate and Rhythm: Normal rate  Pulmonary:      Effort: No respiratory distress  Neurological:      Mental Status: He is alert  Cranial Nerves: Dysarthria present  Motor: Motor strength is normal       Deep Tendon Reflexes:      Reflex Scores:       Tricep reflexes are 2+ on the right side and 2+ on the left side  Bicep reflexes are 2+ on the right side and 2+ on the left side  Brachioradialis reflexes are 2+ on the right side and 2+ on the left side  Patellar reflexes are 2+ on the right side and 2+ on the left side  Achilles reflexes are 2+ on the right side and 2+ on the left side  Neurological Exam  Mental Status  Alert  Moderate dysarthria present  Cerebellar  Language is fluent with no aphasia  Attention and concentration are normal  Fund of knowledge is appropriate for level of education  Cranial Nerves  CN II: Visual fields full to confrontation  CN III, IV, VI: Hypometric saccades  Diminished smooth pursuit  Relative afferent pupillary defect absent    CN V: Facial sensation is normal   CN VII: Full and symmetric facial movement  CN VIII:  Right: Hearing is decreased  Left: Hearing is decreased  CN IX, X: Palate elevates symmetrically  CN XI:  Right: R SCM is tight  CN XII: Tongue midline without atrophy or fasciculations  Motor   Normal upper  Mild hypotonia in lowers  Side to side head tremor with cervical dystonia to the right  Strength is 5/5 throughout all four extremities  Foot tapping slower bilaterally, when attempting to do toe tapping taps heel and toe  Sensory  Light touch is normal in upper and lower extremities  Vibration is normal in upper and lower extremities  Proprioception is normal in upper and lower extremities  Reflexes                                            Right                      Left  Brachioradialis                    2+                         2+  Biceps                                 2+                         2+  Triceps                                2+                         2+  Patellar                                2+                         2+  Achilles                                2+                         2+  Plantar                           Downgoing                Downgoing    Right pathological reflexes: Jessica's absent  Ankle clonus absent  Left pathological reflexes: Jessica's absent  Ankle clonus absent  Diffusely brisk  Coordination    Movements are slower and clumsier, no pass pointing on FNF but slow with some horizontal ataxia close to end point  Heel to shin mildly ataxic bilaterally with slowed movements  Truncal ataxia       Gait  Casual gait: Narrow stance  Reduced stride length  Ataxic and spastic gait  ROS:    Review of Systems   Constitutional: Negative  Negative for appetite change and fever  HENT: Negative  Negative for hearing loss, tinnitus, trouble swallowing and voice change  Eyes: Negative  Negative for photophobia, pain and visual disturbance  Respiratory: Negative    Negative for shortness of breath  Cardiovascular: Negative  Negative for palpitations  Gastrointestinal: Negative  Negative for nausea and vomiting  Endocrine: Negative  Negative for cold intolerance  Genitourinary: Negative  Negative for dysuria, frequency and urgency  Musculoskeletal: Negative  Negative for gait problem, myalgias and neck pain  Skin: Negative  Negative for rash  Allergic/Immunologic: Negative  Neurological: Negative  Negative for dizziness, tremors, seizures, syncope, facial asymmetry, speech difficulty, weakness, light-headedness, numbness and headaches  Hematological: Negative  Does not bruise/bleed easily  Psychiatric/Behavioral: Negative  Negative for confusion, hallucinations and sleep disturbance  Review of systems as documented by the MA was reviewed in full by myself, Maryuri García MD      More than 50% of this time spent with the patient was devoted to counseling and coordination of care  Issues addressed during this clinic visit included overall management, medication counseling or monitoring (including adverse effects, side effects and risks of medications)

## 2022-12-14 ENCOUNTER — TELEPHONE (OUTPATIENT)
Dept: NEUROLOGY | Facility: CLINIC | Age: 37
End: 2022-12-14

## 2022-12-14 NOTE — TELEPHONE ENCOUNTER
Called patient on mobile phone # listed to advise of Auth pending for Cervical Spine MRI    Left detailed voicemail that Marci Shukla is still Pending with insurance and if we do not have the Auth Approval # by 2:00-3:00pm tomorrow (12/15/2022) then test would have to be cancelled and rescheduled once Auth is Approved    Provided my phone # for a return call

## 2023-01-26 DIAGNOSIS — Z12.83 SKIN CANCER SCREENING: Primary | ICD-10-CM

## 2023-01-30 ENCOUNTER — TELEPHONE (OUTPATIENT)
Dept: NEUROLOGY | Facility: CLINIC | Age: 38
End: 2023-01-30

## 2023-01-30 NOTE — TELEPHONE ENCOUNTER
MRI order is still active   Patient can schedule MRI and inform us of when his appointment is and the office can work on authorization     Called patient, left message for return call to office

## 2023-01-30 NOTE — TELEPHONE ENCOUNTER
----- Message from Oskar Hernandez RN sent at 2023 11:39 AM EST -----  Regarding: FW: New referral for MRI of brain  Contact: [phone]    ----- Message -----  From: Jcarlos Wilson  Sent: 2023   9:46 AM EST  To: Neurology [address] [place] [address] Clinical Team 5  Subject: New referral for MRI of brain                    Good morning Dr Nichols Artist,    I was unable to get the brain MRI before the referral for it   Are you able to provide another? I was unable to reschedule before it  due to Covid  I was also made aware that my insurance denied the spinal MRI, and understand I will only be getting a brain scan  Thank you for your time       Erika Marques

## 2023-02-06 ENCOUNTER — TELEPHONE (OUTPATIENT)
Dept: NEUROLOGY | Facility: CLINIC | Age: 38
End: 2023-02-06

## 2023-02-06 DIAGNOSIS — R25.1 TREMOR: ICD-10-CM

## 2023-02-06 DIAGNOSIS — G11.9 CEREBELLAR ATAXIA (HCC): ICD-10-CM

## 2023-02-06 RX ORDER — CLONAZEPAM 0.5 MG/1
TABLET ORAL
Qty: 60 TABLET | Refills: 0 | Status: SHIPPED | OUTPATIENT
Start: 2023-02-06

## 2023-02-06 NOTE — TELEPHONE ENCOUNTER
Second attempt to reach patient  Left vm on home number for return call to office   Unable to leave vm on mobile number as vm box is full

## 2023-02-06 NOTE — TELEPHONE ENCOUNTER
[facility] Ataxia Panel [id number]    CPT Codes: I1284604, [id number], [id number], [id number], [id number], [id number], [id number], [id number]

## 2023-02-06 NOTE — TELEPHONE ENCOUNTER
Dr Nichols Artist - I'm not sure that I knew this patient needed genetic testing   I don't believe I see any encounters regarding this     I do see a form scanned under media    Will work on determining if Soraida Montenegro is required

## 2023-02-06 NOTE — TELEPHONE ENCOUNTER
----- Message from Ismael Cuevas MD sent at 2/6/2023 11:39 AM EST -----  Did we ever get auth for gene dx for him (Ataxia panel)?

## 2023-02-16 NOTE — TELEPHONE ENCOUNTER
Called [facility], was advised to complete PA form and fax to number provided [phone]    Awaiting determination

## 2023-02-17 NOTE — TELEPHONE ENCOUNTER
Insurance called in care is pending for the next 14 days  They need more information  Lorna Rg is the call back person     [phone]  [id number] reference number

## 2023-03-09 DIAGNOSIS — R25.1 TREMOR: ICD-10-CM

## 2023-03-09 DIAGNOSIS — G11.9 CEREBELLAR ATAXIA (HCC): ICD-10-CM

## 2023-03-09 RX ORDER — CLONAZEPAM 0.5 MG/1
TABLET ORAL
Qty: 60 TABLET | Refills: 1 | Status: SHIPPED | OUTPATIENT
Start: 2023-03-09

## 2023-05-14 DIAGNOSIS — R25.1 TREMOR: ICD-10-CM

## 2023-05-14 DIAGNOSIS — G11.9 CEREBELLAR ATAXIA (HCC): ICD-10-CM

## 2023-05-15 RX ORDER — CLONAZEPAM 0.5 MG/1
TABLET ORAL
Qty: 60 TABLET | Refills: 1 | Status: SHIPPED | OUTPATIENT
Start: 2023-05-15

## 2023-05-30 ENCOUNTER — CONSULT (OUTPATIENT)
Dept: DERMATOLOGY | Facility: CLINIC | Age: 38
End: 2023-05-30

## 2023-05-30 VITALS — BODY MASS INDEX: 22.96 KG/M2 | HEIGHT: 70 IN | TEMPERATURE: 98.3 F

## 2023-05-30 DIAGNOSIS — Z12.83 SKIN CANCER SCREENING: Primary | ICD-10-CM

## 2023-05-30 DIAGNOSIS — Z78.9 NORMAL SKIN APPEARANCE: ICD-10-CM

## 2023-05-30 NOTE — PATIENT INSTRUCTIONS
SKIN CANCER SCREENING    Physical Exam and Assessment/Plan by Diagnosis:        What is skin cancer? Skin cancer is unfortunately very common  That's why we are here to help you on your journey to healthy happy skin! There are two main types of skin cancer: melanoma and non-melanoma skin cancer  Melanoma is a form of skin cancer that often arises within an existing nevus or mole  However, this is not always the case  Melanoma can arise anywhere (not only where you have moles right now)  Melanoma can run in families, so letting us know about your family history is important  Non-melanoma skin cancer is the most common type of cancer in the [place]  The two main types of non-melanoma skin cancers are basal cell carcinomas (BCC) and squamous cell carcinoma (SCC)  These cancers tend to be less aggressive than melanomas but are still important to look for and treat  What can I do to prevent skin cancer? One of the largest risk factors for skin cancer is sun exposure or UV radiation  Therefore, sun protection is sanderson! Here are some great tips for protecting yourself! Try to avoid direct sun exposure during peak sun hours (10 AM to 2 PM)  Remember you get A LOT of sun even under cloud coverage and through care windows! When choosing a sunscreen, look for one that says “broad spectrum” sunscreen  This means it protects you from more of the harmful UV rays  Choose a sunscreen that is SPF 30 or greater for best protection  Apply sunscreen to all sun-exposed skin and reapply every 2 hours  Consider sun protective clothing! Great additions to your sun protective clothing wardrobe include broad brimmed hats, sunglasses, UPF clothing  Avoid tanning salons  These have been shown to be very harmful in terms of your risk of skin cancer  Avoid “base tans”  We now know that tans are dangerous (not just sun burns)  If you want to have a tan for a trip, consider a spray tan!     Should I check my skin at home between my dermatology appointments? Yes! It's always a great idea to look at your skin on a regular basis  Here are some things to look for when monitoring your skin  For melanoma, look for the ABCDE's! A = Asymmetry  Look for a spot where one half does not match the other! B = Borders  Look for a spot that has jagged, ragged or irregular borders  C = Color  Look for a spot that is not evenly colored and often includes multiple colors, especially true black, red, white, blue, grey  D = Diameter  Look for a spot that is larger than the size of a pencil eraser  E = Evolution  If you ever have a spot that is changing in shape, color, size or symptoms (becomes itchy, painful or starts to bleed), always call us! For non-melanoma skin cancers, look for a new, pink spot that is not going away, especially one that is itchy, painful or bleeding  What should I do if I see a spot that is concerning for melanoma or non-melanoma skin cancer? If you are ever concerned, call us! Do not wait for your next appointment  We want to help!

## 2023-05-30 NOTE — PROGRESS NOTES
"[facility] Note     Patient Name: Clemente Baron  Encounter Date: 5/30/2023     Have you been cared for by a [facility] in the last 3 years and, if so, which description applies to you? NO  I am considered a \"new\" patient and must complete all patient intake questions  I am MALE/not capable of bearing children  REVIEW OF SYSTEMS:  Have you recently had or currently have any of the following? · Recent fever or chills? No  · Any non-healing wound? No   PAST MEDICAL HISTORY:  Have you personally ever had or currently have any of the following? If \"YES,\" then please provide more detail  · Skin cancer (such as Melanoma, Basal Cell Carcinoma, Squamous Cell Carcinoma? No  · Tuberculosis, HIV/AIDS, Hepatitis B or C: No  · Systemic Immunosuppression such as Diabetes, Biologic or Immunotherapy, Chemotherapy, Organ Transplantation, Bone Marrow Transplantation No  · Radiation Treatment No   FAMILY HISTORY:  Any \"first degree relatives\" (parent, brother, sister, or child) with the following? • Skin Cancer, Pancreatic or Other Cancer? YES, mother -basal cell carcinoma, breast cancer    PATIENT EXPERIENCE:    • Do you want the Dermatologist to perform a COMPLETE skin exam today including a clinical examination under the \"bra and underwear\" areas? Yes  • If necessary, do we have your permission to call and leave a detailed message on your Preferred Phone number that includes your specific medical information? Yes      No Known Allergies   Current Outpatient Medications:   •  clonazePAM (KlonoPIN) 0 5 mg tablet, TAKE 1 TO 2 TABLETS BY MOUTH AT NIGHT FOR TREMORS, Disp: 60 tablet, Rfl: 1  •  Multiple Vitamin (multivitamin) capsule, Take 1 capsule by mouth daily, Disp: , Rfl:           • Whom besides the patient is providing clinical information about today's encounter?    o Spouse/Guardian provided history (patient is a poor historian)        SKIN CANCER SCREENING    Physical Exam and " Assessment/Plan by Diagnosis:       Area on nose:, Left paranasal sulcus at nostril c/w SD  No treatment so far, and nothing helps  What is skin cancer? Skin cancer is unfortunately very common  That's why we are here to help you on your journey to healthy happy skin! There are two main types of skin cancer: melanoma and non-melanoma skin cancer  Melanoma is a form of skin cancer that often arises within an existing nevus or mole  However, this is not always the case  Melanoma can arise anywhere (not only where you have moles right now)  Melanoma can run in families, so letting us know about your family history is important  Non-melanoma skin cancer is the most common type of cancer in the [place]  The two main types of non-melanoma skin cancers are basal cell carcinomas (BCC) and squamous cell carcinoma (SCC)  These cancers tend to be less aggressive than melanomas but are still important to look for and treat  What can I do to prevent skin cancer? One of the largest risk factors for skin cancer is sun exposure or UV radiation  Therefore, sun protection is sanderson! Here are some great tips for protecting yourself!  - Try to avoid direct sun exposure during peak sun hours (10 AM to 2 PM)  - Remember you get A LOT of sun even under cloud coverage and through care windows! - When choosing a sunscreen, look for one that says “broad spectrum” sunscreen  This means it protects you from more of the harmful UV rays  - Choose a sunscreen that is SPF 30 or greater for best protection  - Apply sunscreen to all sun-exposed skin and reapply every 2 hours  - Consider sun protective clothing! Great additions to your sun protective clothing wardrobe include broad brimmed hats, sunglasses, UPF clothing   - Avoid tanning salons  These have been shown to be very harmful in terms of your risk of skin cancer    - Avoid “base tans”  We now know that tans are dangerous (not just sun burns)   If you want to have a tan for a trip, consider a spray tan! Should I check my skin at home between my dermatology appointments? Yes! It's always a great idea to look at your skin on a regular basis  Here are some things to look for when monitoring your skin  - For melanoma, look for the ABCDE's!  o A = Asymmetry  Look for a spot where one half does not match the other!  o B = Borders  Look for a spot that has jagged, ragged or irregular borders  o C = Color  Look for a spot that is not evenly colored and often includes multiple colors, especially true black, red, white, blue, grey  o D = Diameter  Look for a spot that is larger than the size of a pencil eraser  o E = Evolution  If you ever have a spot that is changing in shape, color, size or symptoms (becomes itchy, painful or starts to bleed), always call us!  - For non-melanoma skin cancers, look for a new, pink spot that is not going away, especially one that is itchy, painful or bleeding  What should I do if I see a spot that is concerning for melanoma or non-melanoma skin cancer? If you are ever concerned, call us! Do not wait for your next appointment  We want to help!           Scribe Attestation    I,:  Rin Taveras am acting as a scribe while in the presence of the attending physician :       I,:  Merlene Manriquez MD personally performed the services described in this documentation    as scribed in my presence :

## 2023-07-17 DIAGNOSIS — R25.1 TREMOR: ICD-10-CM

## 2023-07-17 DIAGNOSIS — G11.9 CEREBELLAR ATAXIA (HCC): ICD-10-CM

## 2023-07-17 RX ORDER — CLONAZEPAM 0.5 MG/1
TABLET ORAL
Qty: 60 TABLET | Refills: 0 | Status: SHIPPED | OUTPATIENT
Start: 2023-07-17

## 2023-09-01 DIAGNOSIS — R25.1 TREMOR: ICD-10-CM

## 2023-09-01 DIAGNOSIS — G11.9 CEREBELLAR ATAXIA (HCC): ICD-10-CM

## 2023-09-01 RX ORDER — CLONAZEPAM 0.5 MG/1
TABLET ORAL
Qty: 60 TABLET | Refills: 1 | Status: SHIPPED | OUTPATIENT
Start: 2023-09-01

## 2023-10-05 ENCOUNTER — PATIENT MESSAGE (OUTPATIENT)
Dept: NEUROLOGY | Facility: CLINIC | Age: 38
End: 2023-10-05

## 2023-10-05 DIAGNOSIS — R25.1 TREMOR: ICD-10-CM

## 2023-10-05 DIAGNOSIS — G11.9 CEREBELLAR ATAXIA (HCC): ICD-10-CM

## 2023-10-06 RX ORDER — CLONAZEPAM 0.5 MG/1
0.5 TABLET ORAL
Qty: 60 TABLET | Refills: 1 | Status: SHIPPED | OUTPATIENT
Start: 2023-10-06

## 2023-10-06 NOTE — TELEPHONE ENCOUNTER
From: Christopher Johnson  To: Catherine Casey  Sent: 10/5/2023 10:21 AM EDT  Subject: Refill prescription to a different pharmacy     Good morning,    I tried calling your office today but the phones seem to be down. I am out of my Clonazepam .5mg. There has been a shortage at [facility] the past week so I have been unable to get it. I have called [facility] and they do have it on hand. [facility]pharmacy #[id number] [address], [address] [address] [address]  [phone]   Would you be able to submit this script to that [facility] location? Please let me know if there is anything I can do as I am concerned about not having any medication tonight.      Thank you,   Sruthi Castellanos

## 2023-10-11 ENCOUNTER — PATIENT MESSAGE (OUTPATIENT)
Dept: NEUROLOGY | Facility: CLINIC | Age: 38
End: 2023-10-11

## 2023-10-11 DIAGNOSIS — R25.1 TREMOR: ICD-10-CM

## 2023-10-11 DIAGNOSIS — G11.9 CEREBELLAR ATAXIA (HCC): ICD-10-CM

## 2023-10-11 RX ORDER — CLONAZEPAM 0.5 MG/1
1 TABLET ORAL
Qty: 60 TABLET | Refills: 0 | Status: SHIPPED | OUTPATIENT
Start: 2023-10-11 | End: 2023-11-10

## 2023-11-01 ENCOUNTER — TELEPHONE (OUTPATIENT)
Dept: NEUROLOGY | Facility: CLINIC | Age: 38
End: 2023-11-01

## 2023-11-08 ENCOUNTER — OFFICE VISIT (OUTPATIENT)
Dept: FAMILY MEDICINE CLINIC | Facility: CLINIC | Age: 38
End: 2023-11-08
Payer: COMMERCIAL

## 2023-11-08 VITALS
RESPIRATION RATE: 18 BRPM | DIASTOLIC BLOOD PRESSURE: 72 MMHG | OXYGEN SATURATION: 99 % | SYSTOLIC BLOOD PRESSURE: 132 MMHG | HEART RATE: 74 BPM

## 2023-11-08 DIAGNOSIS — Z13.6 SCREENING FOR CARDIOVASCULAR CONDITION: ICD-10-CM

## 2023-11-08 DIAGNOSIS — G11.9 CEREBELLAR ATAXIA (HCC): ICD-10-CM

## 2023-11-08 DIAGNOSIS — Z13.1 SCREENING FOR DIABETES MELLITUS: ICD-10-CM

## 2023-11-08 DIAGNOSIS — R25.1 TREMOR: ICD-10-CM

## 2023-11-08 DIAGNOSIS — Z23 ENCOUNTER FOR IMMUNIZATION: ICD-10-CM

## 2023-11-08 DIAGNOSIS — Z00.00 ANNUAL PHYSICAL EXAM: Primary | ICD-10-CM

## 2023-11-08 PROCEDURE — 90686 IIV4 VACC NO PRSV 0.5 ML IM: CPT | Performed by: NURSE PRACTITIONER

## 2023-11-08 PROCEDURE — 99395 PREV VISIT EST AGE 18-39: CPT | Performed by: NURSE PRACTITIONER

## 2023-11-08 PROCEDURE — 90471 IMMUNIZATION ADMIN: CPT | Performed by: NURSE PRACTITIONER

## 2023-11-08 NOTE — PATIENT INSTRUCTIONS
Wellness Visit for Adults   AMBULATORY CARE:   A wellness visit  is when you see your healthcare provider to get screened for health problems. Your healthcare provider will also give you advice on how to stay healthy. Write down your questions so you remember to ask them. Ask your healthcare provider how often you should have a wellness visit. What happens at a wellness visit:  Your healthcare provider will ask about your health, and your family history of health problems. This includes high blood pressure, heart disease, and cancer. He or she will ask if you have symptoms that concern you, if you smoke, and about your mood. You may also be asked about your intake of medicines, supplements, food, and alcohol. Any of the following may be done: Your weight  will be checked. Your height may also be checked so your body mass index (BMI) can be calculated. Your BMI shows if you are at a healthy weight. Your blood pressure  and heart rate will be checked. Your temperature may also be checked. Blood and urine tests  may be done. Blood tests may be done to check your cholesterol levels. Abnormal cholesterol levels increase your risk for heart disease and stroke. You may also need a blood or urine test to check for diabetes if you are at increased risk. Urine tests may be done to look for signs of an infection or kidney disease. A physical exam  includes checking your heartbeat and lungs with a stethoscope. Your healthcare provider may also check your skin to look for sun damage. Screening tests  may be recommended. A screening test is done to check for diseases that may not cause symptoms. The screening tests you may need depend on your age, gender, family history, and lifestyle habits. For example, colorectal screening may be recommended if you are 48years old or older. Screening tests you need if you are a woman:   A Pap smear  is used to screen for cervical cancer.  Pap smears are usually done every 3 to 5 years depending on your age. You may need them more often if you have had abnormal Pap smear test results in the past. Ask your healthcare provider how often you should have a Pap smear. A mammogram  is an x-ray of your breasts to screen for breast cancer. Experts recommend mammograms every 2 years starting at age 48 years. You may need a mammogram at age 52 years or younger if you have an increased risk for breast cancer. Talk to your healthcare provider about when you should start having mammograms and how often you need them. Vaccines you may need:   Get an influenza vaccine  every year. The influenza vaccine protects you from the flu. Several types of viruses cause the flu. The viruses change over time, so new vaccines are made each year. Get a tetanus-diphtheria (Td) booster vaccine  every 10 years. This vaccine protects you against tetanus and diphtheria. Tetanus is a severe infection that may cause painful muscle spasms and lockjaw. Diphtheria is a severe bacterial infection that causes a thick covering in the back of your mouth and throat. Get a human papillomavirus (HPV) vaccine  if you are female and aged 23 to 32 or male 23 to 24 and never received it. This vaccine protects you from HPV infection. HPV is the most common infection spread by sexual contact. HPV may also cause vaginal, penile, and anal cancers. Get a pneumococcal vaccine  if you are aged 72 years or older. The pneumococcal vaccine is an injection given to protect you from pneumococcal disease. Pneumococcal disease is an infection caused by pneumococcal bacteria. The infection may cause pneumonia, meningitis, or an ear infection. Get a shingles vaccine  if you are 60 or older, even if you have had shingles before. The shingles vaccine is an injection to protect you from the varicella-zoster virus. This is the same virus that causes chickenpox.  Shingles is a painful rash that develops in people who had chickenpox or have been exposed to the virus. How to eat healthy:  My Plate is a model for planning healthy meals. It shows the types and amounts of foods that should go on your plate. Fruits and vegetables make up about half of your plate, and grains and protein make up the other half. A serving of dairy is included on the side of your plate. The amount of calories and serving sizes you need depends on your age, gender, weight, and height. Examples of healthy foods are listed below:  Eat a variety of vegetables  such as dark green, red, and orange vegetables. You can also include canned vegetables low in sodium (salt) and frozen vegetables without added butter or sauces. Eat a variety of fresh fruits , canned fruit in 100% juice, frozen fruit, and dried fruit. Include whole grains. At least half of the grains you eat should be whole grains. Examples include whole-wheat bread, wheat pasta, brown rice, and whole-grain cereals such as oatmeal.    Eat a variety of protein foods such as seafood (fish and shellfish), lean meat, and poultry without skin (turkey and chicken). Examples of lean meats include pork leg, shoulder, or tenderloin, and beef round, sirloin, tenderloin, and extra lean ground beef. Other protein foods include eggs and egg substitutes, beans, peas, soy products, nuts, and seeds. Choose low-fat dairy products such as skim or 1% milk or low-fat yogurt, cheese, and cottage cheese. Limit unhealthy fats  such as butter, hard margarine, and shortening. Exercise:  Exercise at least 30 minutes per day on most days of the week. Some examples of exercise include walking, biking, dancing, and swimming. You can also fit in more physical activity by taking the stairs instead of the elevator or parking farther away from stores. Include muscle strengthening activities 2 days each week. Regular exercise provides many health benefits.  It helps you manage your weight, and decreases your risk for type 2 diabetes, heart disease, stroke, and high blood pressure. Exercise can also help improve your mood. Ask your healthcare provider about the best exercise plan for you. General health and safety guidelines:   Do not smoke. Nicotine and other chemicals in cigarettes and cigars can cause lung damage. Ask your healthcare provider for information if you currently smoke and need help to quit. E-cigarettes or smokeless tobacco still contain nicotine. Talk to your healthcare provider before you use these products. Limit alcohol. A drink of alcohol is 12 ounces of beer, 5 ounces of wine, or 1½ ounces of liquor. Lose weight, if needed. Being overweight increases your risk of certain health conditions. These include heart disease, high blood pressure, type 2 diabetes, and certain types of cancer. Protect your skin. Do not sunbathe or use tanning beds. Use sunscreen with a SPF 15 or higher. Apply sunscreen at least 15 minutes before you go outside. Reapply sunscreen every 2 hours. Wear protective clothing, hats, and sunglasses when you are outside. Drive safely. Always wear your seatbelt. Make sure everyone in your car wears a seatbelt. A seatbelt can save your life if you are in an accident. Do not use your cell phone when you are driving. This could distract you and cause an accident. Pull over if you need to make a call or send a text message. Practice safe sex. Use latex condoms if are sexually active and have more than one partner. Your healthcare provider may recommend screening tests for sexually transmitted infections (STIs). Wear helmets, lifejackets, and protective gear. Always wear a helmet when you ride a bike or motorcycle, go skiing, or play sports that could cause a head injury. Wear protective equipment when you play sports. Wear a lifejacket when you are on a boat or doing water sports.     © Copyright [employer] 2023 Information is for End User's use only and may not be sold, redistributed or otherwise used for commercial purposes. The above information is an  only. It is not intended as medical advice for individual conditions or treatments. Talk to your doctor, nurse or pharmacist before following any medical regimen to see if it is safe and effective for you.

## 2023-12-04 DIAGNOSIS — G11.9 CEREBELLAR ATAXIA (HCC): ICD-10-CM

## 2023-12-04 DIAGNOSIS — R25.1 TREMOR: ICD-10-CM

## 2023-12-04 RX ORDER — CLONAZEPAM 0.5 MG/1
1 TABLET ORAL
Qty: 60 TABLET | Refills: 0 | Status: SHIPPED | OUTPATIENT
Start: 2023-12-04 | End: 2024-01-03

## 2024-01-02 DIAGNOSIS — R25.1 TREMOR: ICD-10-CM

## 2024-01-02 DIAGNOSIS — G11.9 CEREBELLAR ATAXIA (HCC): ICD-10-CM

## 2024-01-02 RX ORDER — CLONAZEPAM 0.5 MG/1
1 TABLET ORAL
Qty: 60 TABLET | Refills: 0 | Status: SHIPPED | OUTPATIENT
Start: 2024-01-02 | End: 2024-02-01

## 2024-01-07 PROBLEM — Z13.1 SCREENING FOR DIABETES MELLITUS: Status: RESOLVED | Noted: 2023-11-08 | Resolved: 2024-01-07

## 2024-01-07 PROBLEM — Z13.6 SCREENING FOR CARDIOVASCULAR CONDITION: Status: RESOLVED | Noted: 2023-11-08 | Resolved: 2024-01-07

## 2024-01-18 ENCOUNTER — TELEPHONE (OUTPATIENT)
Dept: FAMILY MEDICINE CLINIC | Facility: CLINIC | Age: 39
End: 2024-01-18

## 2024-01-18 DIAGNOSIS — D72.819 LEUKOPENIA, UNSPECIFIED TYPE: Primary | ICD-10-CM

## 2024-01-18 LAB
ALBUMIN SERPL-MCNC: 4.3 G/DL (ref 3.6–5.1)
ALBUMIN/GLOB SERPL: 2.2 (CALC) (ref 1–2.5)
ALP SERPL-CCNC: 76 U/L (ref 36–130)
ALT SERPL-CCNC: 26 U/L (ref 9–46)
AST SERPL-CCNC: 25 U/L (ref 10–40)
BASOPHILS # BLD AUTO: 30 CELLS/UL (ref 0–200)
BASOPHILS NFR BLD AUTO: 0.8 %
BILIRUB SERPL-MCNC: 1.2 MG/DL (ref 0.2–1.2)
BUN SERPL-MCNC: 19 MG/DL (ref 7–25)
BUN/CREAT SERPL: NORMAL (CALC) (ref 6–22)
CALCIUM SERPL-MCNC: 9.3 MG/DL (ref 8.6–10.3)
CHLORIDE SERPL-SCNC: 103 MMOL/L (ref 98–110)
CHOLEST SERPL-MCNC: 120 MG/DL
CHOLEST/HDLC SERPL: 2.9 (CALC)
CO2 SERPL-SCNC: 32 MMOL/L (ref 20–32)
CREAT SERPL-MCNC: 1.04 MG/DL (ref 0.6–1.26)
EOSINOPHIL # BLD AUTO: 70 CELLS/UL (ref 15–500)
EOSINOPHIL NFR BLD AUTO: 1.9 %
ERYTHROCYTE [DISTWIDTH] IN BLOOD BY AUTOMATED COUNT: 12.6 % (ref 11–15)
GFR/BSA.PRED SERPLBLD CYS-BASED-ARV: 95 ML/MIN/1.73M2
GLOBULIN SER CALC-MCNC: 2 G/DL (CALC) (ref 1.9–3.7)
GLUCOSE SERPL-MCNC: 82 MG/DL (ref 65–99)
HCT VFR BLD AUTO: 42.6 % (ref 38.5–50)
HDLC SERPL-MCNC: 42 MG/DL
HGB BLD-MCNC: 15 G/DL (ref 13.2–17.1)
LDLC SERPL CALC-MCNC: 60 MG/DL (CALC)
LYMPHOCYTES # BLD AUTO: 1617 CELLS/UL (ref 850–3900)
LYMPHOCYTES NFR BLD AUTO: 43.7 %
MCH RBC QN AUTO: 29.7 PG (ref 27–33)
MCHC RBC AUTO-ENTMCNC: 35.2 G/DL (ref 32–36)
MCV RBC AUTO: 84.4 FL (ref 80–100)
MONOCYTES # BLD AUTO: 289 CELLS/UL (ref 200–950)
MONOCYTES NFR BLD AUTO: 7.8 %
NEUTROPHILS # BLD AUTO: 1695 CELLS/UL (ref 1500–7800)
NEUTROPHILS NFR BLD AUTO: 45.8 %
NONHDLC SERPL-MCNC: 78 MG/DL (CALC)
PLATELET # BLD AUTO: 196 THOUSAND/UL (ref 140–400)
PMV BLD REES-ECKER: 11.4 FL (ref 7.5–12.5)
POTASSIUM SERPL-SCNC: 3.7 MMOL/L (ref 3.5–5.3)
PROT SERPL-MCNC: 6.3 G/DL (ref 6.1–8.1)
RBC # BLD AUTO: 5.05 MILLION/UL (ref 4.2–5.8)
SODIUM SERPL-SCNC: 141 MMOL/L (ref 135–146)
TRIGL SERPL-MCNC: 94 MG/DL
TSH SERPL-ACNC: 1.46 MIU/L (ref 0.4–4.5)
WBC # BLD AUTO: 3.7 THOUSAND/UL (ref 3.8–10.8)

## 2024-01-18 NOTE — TELEPHONE ENCOUNTER
----- Message from JOSE Newsome sent at 1/18/2024  8:19 AM EST -----  Please let the patient know that his lab work was normal except his WBC was slightly low. Repeat CBC in 2 weeks. Order in the computer.

## 2024-01-22 ENCOUNTER — OFFICE VISIT (OUTPATIENT)
Dept: FAMILY MEDICINE CLINIC | Facility: CLINIC | Age: 39
End: 2024-01-22
Payer: COMMERCIAL

## 2024-01-22 VITALS
RESPIRATION RATE: 16 BRPM | SYSTOLIC BLOOD PRESSURE: 126 MMHG | DIASTOLIC BLOOD PRESSURE: 76 MMHG | HEIGHT: 70 IN | OXYGEN SATURATION: 99 % | BODY MASS INDEX: 22.96 KG/M2 | HEART RATE: 68 BPM

## 2024-01-22 DIAGNOSIS — R09.81 NASAL CONGESTION: ICD-10-CM

## 2024-01-22 DIAGNOSIS — H90.3 SENSORINEURAL HEARING LOSS (SNHL) OF BOTH EARS: Primary | ICD-10-CM

## 2024-01-22 PROCEDURE — 99213 OFFICE O/P EST LOW 20 MIN: CPT | Performed by: FAMILY MEDICINE

## 2024-01-22 RX ORDER — AZELASTINE 1 MG/ML
1 SPRAY, METERED NASAL 2 TIMES DAILY
Qty: 30 ML | Refills: 0 | Status: SHIPPED | OUTPATIENT
Start: 2024-01-22

## 2024-01-22 NOTE — ASSESSMENT & PLAN NOTE
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  Not certain what could be done for sensorineural hearing loss

## 2024-01-22 NOTE — PROGRESS NOTES
Subjective:      Patient ID: Emilio Simmons is a 38 y.o. male.    38-year-old male with history of cerebellar ataxia presents with his girlfriend complaining of decreased hearing, bilateral ear pressure as well as nasal congestion.  Patient states that he has had decreased hearing for several years and his girlfriend notes that his hearing has been declining.  He did have evaluations with ENT and had been given hearing aids which she was admittedly not good with wearing.  They both recently had upper respiratory infection and he has been having residual nasal congestion.  No fevers or chills.        Past Medical History:   Diagnosis Date   • Neurologic abnormality        Family History   Problem Relation Age of Onset   • Basal cell carcinoma Mother    • Breast cancer Mother    • No Known Problems Father    • Irritable bowel syndrome Sister    • Liver disease Paternal Uncle    • Colon cancer Maternal Grandmother        No past surgical history on file.     reports that he has never smoked. He has never used smokeless tobacco. He reports that he does not drink alcohol and does not use drugs.      Current Outpatient Medications:   •  azelastine (ASTELIN) 0.1 % nasal spray, 1 spray into each nostril 2 (two) times a day Use in each nostril as directed, Disp: 30 mL, Rfl: 0  •  ciclopirox (LOPROX) 0.77 % cream, Apply topically 2 (two) times a day To affected areas of face, Disp: 15 g, Rfl: 1  •  clonazePAM (KlonoPIN) 0.5 mg tablet, TAKE 2 TABLETS (1 MG TOTAL) BY MOUTH DAILY AT BEDTIME, Disp: 60 tablet, Rfl: 0  •  Multiple Vitamin (multivitamin) capsule, Take 1 capsule by mouth daily, Disp: , Rfl:     The following portions of the patient's history were reviewed and updated as appropriate: allergies, current medications, past family history, past medical history, past social history, past surgical history and problem list.    Review of Systems   HENT:  Positive for congestion, ear pain, hearing loss and sinus pressure.   "  Musculoskeletal:  Positive for gait problem.   Neurological:  Positive for tremors.           Objective:    /76 (BP Location: Right arm, Patient Position: Sitting, Cuff Size: Standard)   Pulse 68   Resp 16   Ht 5' 10\" (1.778 m)   SpO2 99%   BMI 22.96 kg/m²      Physical Exam  Vitals and nursing note reviewed.   HENT:      Head: Normocephalic and atraumatic.      Right Ear: Tympanic membrane, ear canal and external ear normal. There is no impacted cerumen.      Left Ear: Tympanic membrane, ear canal and external ear normal. There is no impacted cerumen.      Nose: Congestion present. No rhinorrhea.      Mouth/Throat:      Mouth: Mucous membranes are moist.      Pharynx: Oropharynx is clear.   Neurological:      Mental Status: He is alert.      Comments: In wheelchair, head tremor noted           Recent Results (from the past 1008 hour(s))   Lipid Panel with Direct LDL reflex    Collection Time: 01/17/24 12:54 PM   Result Value Ref Range    Total Cholesterol 120 <200 mg/dL    HDL 42 > OR = 40 mg/dL    Triglycerides 94 <150 mg/dL    LDL Calculated 60 mg/dL (calc)    Chol HDLC Ratio 2.9 <5.0 (calc)    Non-HDL Cholesterol 78 <130 mg/dL (calc)   Comprehensive metabolic panel    Collection Time: 01/17/24 12:54 PM   Result Value Ref Range    Glucose, Random 82 65 - 99 mg/dL    BUN 19 7 - 25 mg/dL    Creatinine 1.04 0.60 - 1.26 mg/dL    eGFR 95 > OR = 60 mL/min/1.73m2    SL AMB BUN/CREATININE RATIO SEE NOTE: 6 - 22 (calc)    Sodium 141 135 - 146 mmol/L    Potassium 3.7 3.5 - 5.3 mmol/L    Chloride 103 98 - 110 mmol/L    CO2 32 20 - 32 mmol/L    Calcium 9.3 8.6 - 10.3 mg/dL    Protein, Total 6.3 6.1 - 8.1 g/dL    Albumin 4.3 3.6 - 5.1 g/dL    Globulin 2.0 1.9 - 3.7 g/dL (calc)    Albumin/Globulin Ratio 2.2 1.0 - 2.5 (calc)    TOTAL BILIRUBIN 1.2 0.2 - 1.2 mg/dL    Alkaline Phosphatase 76 36 - 130 U/L    AST 25 10 - 40 U/L    ALT 26 9 - 46 U/L   CBC and differential    Collection Time: 01/17/24 12:54 PM   Result " Value Ref Range    White Blood Cell Count 3.7 (L) 3.8 - 10.8 Thousand/uL    Red Blood Cell Count 5.05 4.20 - 5.80 Million/uL    Hemoglobin 15.0 13.2 - 17.1 g/dL    HCT 42.6 38.5 - 50.0 %    MCV 84.4 80.0 - 100.0 fL    MCH 29.7 27.0 - 33.0 pg    MCHC 35.2 32.0 - 36.0 g/dL    RDW 12.6 11.0 - 15.0 %    Platelet Count 196 140 - 400 Thousand/uL    SL AMB MPV 11.4 7.5 - 12.5 fL    Neutrophils (Absolute) 1,695 1,500 - 7,800 cells/uL    Lymphocytes (Absolute) 1,617 850 - 3,900 cells/uL    Monocytes (Absolute) 289 200 - 950 cells/uL    Eosinophils (Absolute) 70 15 - 500 cells/uL    Basophils ABS 30 0 - 200 cells/uL    Neutrophils 45.8 %    Lymphocytes 43.7 %    Monocytes 7.8 %    Eosinophils 1.9 %    Basophils PCT 0.8 %   TSH, 3rd generation with Free T4 reflex    Collection Time: 01/17/24 12:54 PM   Result Value Ref Range    TSH W/RFX TO FREE T4 1.46 0.40 - 4.50 mIU/L       Assessment/Plan:    Sensorineural hearing loss (SNHL) of both ears  -Patient apparently has been told that he has sensorineural hearing loss in the past    Patient has cerebellar ataxia and is following up with neurology    Referral to ENT provided.  Not certain what could be done for sensorineural hearing loss    Nasal congestion  Astelin nasal spray prescription provided          Problem List Items Addressed This Visit        Nervous and Auditory    Sensorineural hearing loss (SNHL) of both ears - Primary     -Patient apparently has been told that he has sensorineural hearing loss in the past    Patient has cerebellar ataxia and is following up with neurology    Referral to ENT provided.  Not certain what could be done for sensorineural hearing loss         Relevant Orders    Ambulatory Referral to Otolaryngology       Other    Nasal congestion     Astelin nasal spray prescription provided         Relevant Medications    azelastine (ASTELIN) 0.1 % nasal spray

## 2024-01-31 DIAGNOSIS — R25.1 TREMOR: ICD-10-CM

## 2024-01-31 DIAGNOSIS — G11.9 CEREBELLAR ATAXIA (HCC): ICD-10-CM

## 2024-01-31 RX ORDER — CLONAZEPAM 0.5 MG/1
1 TABLET ORAL
Qty: 60 TABLET | Refills: 0 | Status: SHIPPED | OUTPATIENT
Start: 2024-01-31 | End: 2024-03-01

## 2024-02-05 DIAGNOSIS — H90.3 SENSORINEURAL HEARING LOSS (SNHL) OF BOTH EARS: Primary | ICD-10-CM

## 2024-02-08 ENCOUNTER — TELEPHONE (OUTPATIENT)
Dept: NEUROLOGY | Facility: CLINIC | Age: 39
End: 2024-02-08

## 2024-02-08 NOTE — TELEPHONE ENCOUNTER
LMOM to confirm pt's appt w/ Dr. Hilliard in [facility] on 2/15/24 at 3:30. Advised pt to arrive 15 minutes prior to check in with the .

## 2024-02-15 ENCOUNTER — OFFICE VISIT (OUTPATIENT)
Dept: NEUROLOGY | Facility: CLINIC | Age: 39
End: 2024-02-15
Payer: COMMERCIAL

## 2024-02-15 VITALS
WEIGHT: 163 LBS | HEIGHT: 70 IN | DIASTOLIC BLOOD PRESSURE: 63 MMHG | HEART RATE: 65 BPM | TEMPERATURE: 97.3 F | SYSTOLIC BLOOD PRESSURE: 141 MMHG | BODY MASS INDEX: 23.34 KG/M2

## 2024-02-15 DIAGNOSIS — G11.9 CEREBELLAR ATAXIA (HCC): ICD-10-CM

## 2024-02-15 DIAGNOSIS — R25.1 TREMORS OF NERVOUS SYSTEM: ICD-10-CM

## 2024-02-15 DIAGNOSIS — R51.9 NONINTRACTABLE HEADACHE, UNSPECIFIED CHRONICITY PATTERN, UNSPECIFIED HEADACHE TYPE: Primary | ICD-10-CM

## 2024-02-15 PROCEDURE — 99214 OFFICE O/P EST MOD 30 MIN: CPT | Performed by: PSYCHIATRY & NEUROLOGY

## 2024-02-15 RX ORDER — LORAZEPAM 0.5 MG/1
0.5 TABLET ORAL
Qty: 4 TABLET | Refills: 0 | Status: SHIPPED | OUTPATIENT
Start: 2024-02-15

## 2024-02-15 NOTE — ASSESSMENT & PLAN NOTE
This patient has longstanding history with progressive ataxia, symptoms started in the year 2009, when patient was about 23 years of age, has had prior sensorineural hearing loss from age 17.  The symptoms started after a minor flulike illness, however within a few months had significant decline with change in his speech, tremors in both upper and lower extremities.  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  Over the last decade, patient relatively has been stable, has not had any decline, but clearly has not had any improvement.  We had discussed genetic testing in the past, unfortunately declined by insurance, agree that would not change his management.  No family history of any other neurodegenerative disease, specifically ataxias.  At this time, I do not think he needs any significant testing, I had recommended a brain MRI to look for any interval changes, recommended we get that scheduled again, specifically with new onset of headaches over the last 6 months.  From a symptomatic treatment standpoint, he will continue the low-dose Klonopin at nighttime which has been helpful.  We had discussed botulinum toxin injections in the past, which we have decided to hold off for now.    He continues to remain physically active, does exercise on a regular basis which was encouraged today.  He will return for a follow-up with me in one year, and has my contact information for any questions or concerns.    Lastly, I did fill the form for handicap placard to be sent to [employer] today.

## 2024-02-15 NOTE — PROGRESS NOTES
Patient ID: Emilio Simmons is a 38 y.o. male.    Assessment/Plan:    Cerebellar ataxia, ambulatory dysfunction, dysarthria, cervical dystonia  This patient has longstanding history with progressive ataxia, symptoms started in the year 2009, when patient was about 23 years of age, has had prior sensorineural hearing loss from age 17.  The symptoms started after a minor flulike illness, however within a few months had significant decline with change in his speech, tremors in both upper and lower extremities.  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  Over the last decade, patient relatively has been stable, has not had any decline, but clearly has not had any improvement.  We had discussed genetic testing in the past, unfortunately declined by insurance, agree that would not change his management.  No family history of any other neurodegenerative disease, specifically ataxias.  At this time, I do not think he needs any significant testing, I had recommended a brain MRI to look for any interval changes, recommended we get that scheduled again, specifically with new onset of headaches over the last 6 months.  From a symptomatic treatment standpoint, he will continue the low-dose Klonopin at nighttime which has been helpful.  We had discussed botulinum toxin injections in the past, which we have decided to hold off for now.    He continues to remain physically active, does exercise on a regular basis which was encouraged today.  He will return for a follow-up with me in one year, and has my contact information for any questions or concerns.    Lastly, I did fill the form for handicap placard to be sent to [facility] today.       Diagnoses and all orders for this visit:    Nonintractable headache, unspecified chronicity pattern, unspecified headache type  -     MRI brain wo contrast;  Future    Tremors of nervous system  -     LORazepam (Ativan) 0.5 mg tablet; Take 1 tablet (0.5 mg total) by mouth 30 min pre-procedure    Cerebellar ataxia, ambulatory dysfunction, dysarthria, cervical dystonia           Subjective:    HPI    I had the pleasure of seeing your patient in neurology clinic for neuromuscular follow-up.  As you know, he is a 38-year-old man with longstanding history of cerebellar ataxia.  He was last seen by me in November 2022, now returns for follow-up.    Since last being seen, patient denied any significant change in his symptoms, continues to have tremors in his head, dysarthria, tremors in both upper and lower extremities which is unchanged.  Denies any pain, or paresthesias in upper or lower extremities.  Vision is okay, does have sensorineural hearing loss, reports some pain in the left ear, has an appointment with ENT soon.  He is sleeping better since he has been on Klonopin 1 mg at bedtime, tremors/myoclonic movements are not interfering with his sleep.  He continues to remain active during the day, is able to do all activities of daily living by himself, mostly relies on his wheelchair for ambulation, does stretch and exercise at home, has handlebars, they do have a shower chair and all the other adaptive equipment at home.    Besides this, does report increased frequency of headaches, getting a headache almost 3 times a week, most of these are mild, however may get 3-4 headaches in a month when he would need to take Tylenol/Motrin.  Denies any dizziness with the headaches, no nausea/vomiting, no significant photophobia or phonophobia..    Plays video games to spend his time, has been able to join the group for online [employer].    On his previous visit, we had discussed repeat genetic testing for spinocerebellar ataxia's, unfortunately denied by insurance.  I had also recommended brain MRI and cervical spine MRI, C-spine MRI was not approved by insurance, has not been able to  get the brain MRI because of some other logistics.    Denies any other complaints today.    Prior history from initial evaluation in November 2022:  Briefly, patient is a 37-year-old man who is here to establish care again with a prior diagnosis of cerebellar ataxia.  To note, patient had sensory neural hearing loss at age 17.  When he was about 23 years of age, in year 2009, patient had a minor flu like illness.  Within a month or 2 afterwards he started noticing change in his speech, which was reported at neck slurred speech, over the next 1 month he started noticing tremors in his head, both upper and lower extremities, with fine motor dysfunction.  He had extensive evaluation at [facility], as well as [facility] at the time.  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  He has not had any evaluation since 2012, was evaluated in [place] at some point for the head tremor, did try botulinum toxin injections which he did not find helpful.  Over the last 10 years, he does not think there has been any decline in his symptoms, he continues to function independently for most activities of daily living.  Continues to have ataxia, tremors including head tremors, poor coordination in upper and lower extremities.  He does not ambulate, relies on his wheelchair for the most part.  He can dress himself.  Handwriting continues to be affected, no swallowing issues, speech continues to be abnormal, no paresthesias in upper or lower extremities, has some urinary urgency, but no accidents, no bowel symptoms.      The following portions of the patient's history were reviewed and updated as appropriate:  "allergies, current medications, past family history, past medical history, past social history, past surgical history, and problem list.         Objective:    Blood pressure 141/63, pulse 65, temperature (!) 97.3 °F (36.3 °C), temperature source Temporal, height 5' 10\" (1.778 m), weight 73.9 kg (163 lb).    Physical Exam  On general exam, patient was not in any acute distress.  HEENT was unremarkable.  Extremities did not reveal any edema.    Neurological Exam  Neurologically, patient was awake and alert.  Speech was dysarthric, has a scanning quality to his speech.  Has a dystonic tremor with head slightly tilted to the right with tightness in the splenius capitis muscles, left worse than the right with head intubation.  On cranial nerve examination, I did not appreciate any ptosis, extraocular movements were slow but smooth, no saccadic substitution was noted.  He did not endorse any diplopia.  Strength of eye closure muscles was normal, no facial weakness or asymmetry, tongue was midline.  Tone was slightly reduced in the lower extremities in the uppers, strength was normal in all muscle groups in both upper and lower extremities, proximally and distally.  Has dysmetria in both upper and lower extremities, poor fine motor control, no postural or resting tremor noted, finger-to-nose and knee to help impaired.  Reflexes were 2+ throughout.      ROS:  I reviewed the below ROS and what is mentioned in HPI, the remainder of ROS was negative.    Review of Systems  Review of Systems   Constitutional:  Negative for appetite change, fatigue and fever.   HENT: Negative.  Negative for hearing loss, tinnitus, trouble swallowing and voice change.    Eyes: Negative.  Negative for photophobia, pain and visual disturbance.   Respiratory: Negative.  Negative for shortness of breath.    Cardiovascular: Negative.  Negative for palpitations.   Gastrointestinal: Negative.  Negative for nausea and vomiting.   Endocrine: Negative.  " Negative for cold intolerance.   Genitourinary: Negative.  Negative for dysuria, frequency and urgency.   Musculoskeletal:  Negative for back pain, gait problem, myalgias, neck pain and neck stiffness.   Skin: Negative.  Negative for rash.   Allergic/Immunologic: Negative.    Neurological:  Positive for tremors and headaches. Negative for dizziness, seizures, syncope, facial asymmetry, speech difficulty, weakness, light-headedness and numbness.   Hematological: Negative.  Does not bruise/bleed easily.   Psychiatric/Behavioral: Negative.  Negative for confusion, hallucinations and sleep disturbance.

## 2024-02-15 NOTE — PROGRESS NOTES
Review of Systems   Constitutional:  Negative for appetite change, fatigue and fever.   HENT: Negative.  Negative for hearing loss, tinnitus, trouble swallowing and voice change.    Eyes: Negative.  Negative for photophobia, pain and visual disturbance.   Respiratory: Negative.  Negative for shortness of breath.    Cardiovascular: Negative.  Negative for palpitations.   Gastrointestinal: Negative.  Negative for nausea and vomiting.   Endocrine: Negative.  Negative for cold intolerance.   Genitourinary: Negative.  Negative for dysuria, frequency and urgency.   Musculoskeletal:  Negative for back pain, gait problem, myalgias, neck pain and neck stiffness.   Skin: Negative.  Negative for rash.   Allergic/Immunologic: Negative.    Neurological:  Positive for tremors and headaches. Negative for dizziness, seizures, syncope, facial asymmetry, speech difficulty, weakness, light-headedness and numbness.   Hematological: Negative.  Does not bruise/bleed easily.   Psychiatric/Behavioral: Negative.  Negative for confusion, hallucinations and sleep disturbance.

## 2024-03-02 LAB
BASOPHILS # BLD AUTO: 31 CELLS/UL (ref 0–200)
BASOPHILS NFR BLD AUTO: 0.8 %
EOSINOPHIL # BLD AUTO: 51 CELLS/UL (ref 15–500)
EOSINOPHIL NFR BLD AUTO: 1.3 %
ERYTHROCYTE [DISTWIDTH] IN BLOOD BY AUTOMATED COUNT: 12.2 % (ref 11–15)
HCT VFR BLD AUTO: 42.6 % (ref 38.5–50)
HGB BLD-MCNC: 15 G/DL (ref 13.2–17.1)
LYMPHOCYTES # BLD AUTO: 1275 CELLS/UL (ref 850–3900)
LYMPHOCYTES NFR BLD AUTO: 32.7 %
MCH RBC QN AUTO: 29.8 PG (ref 27–33)
MCHC RBC AUTO-ENTMCNC: 35.2 G/DL (ref 32–36)
MCV RBC AUTO: 84.5 FL (ref 80–100)
MONOCYTES # BLD AUTO: 300 CELLS/UL (ref 200–950)
MONOCYTES NFR BLD AUTO: 7.7 %
NEUTROPHILS # BLD AUTO: 2243 CELLS/UL (ref 1500–7800)
NEUTROPHILS NFR BLD AUTO: 57.5 %
PLATELET # BLD AUTO: 184 THOUSAND/UL (ref 140–400)
PMV BLD REES-ECKER: 11.2 FL (ref 7.5–12.5)
RBC # BLD AUTO: 5.04 MILLION/UL (ref 4.2–5.8)
WBC # BLD AUTO: 3.9 THOUSAND/UL (ref 3.8–10.8)

## 2024-03-04 ENCOUNTER — TELEPHONE (OUTPATIENT)
Dept: FAMILY MEDICINE CLINIC | Facility: CLINIC | Age: 39
End: 2024-03-04

## 2024-03-04 DIAGNOSIS — G11.9 CEREBELLAR ATAXIA (HCC): ICD-10-CM

## 2024-03-04 DIAGNOSIS — R25.1 TREMOR: ICD-10-CM

## 2024-03-04 RX ORDER — CLONAZEPAM 0.5 MG/1
1 TABLET ORAL
Qty: 60 TABLET | Refills: 0 | Status: SHIPPED | OUTPATIENT
Start: 2024-03-04 | End: 2024-04-03

## 2024-03-04 NOTE — TELEPHONE ENCOUNTER
----- Message from JOSE Newsome sent at 3/4/2024  1:29 PM EST -----  Please let the patient know that his CBC was normal.

## 2024-03-05 ENCOUNTER — HOSPITAL ENCOUNTER (OUTPATIENT)
Dept: MRI IMAGING | Facility: HOSPITAL | Age: 39
Discharge: HOME | End: 2024-03-05
Payer: COMMERCIAL

## 2024-03-05 DIAGNOSIS — R51.9 NONINTRACTABLE HEADACHE, UNSPECIFIED CHRONICITY PATTERN, UNSPECIFIED HEADACHE TYPE: ICD-10-CM

## 2024-03-05 PROCEDURE — G1004 CDSM NDSC: HCPCS

## 2024-03-05 PROCEDURE — 70551 MRI BRAIN STEM W/O DYE: CPT

## 2024-03-11 ENCOUNTER — TELEPHONE (OUTPATIENT)
Dept: NEUROLOGY | Facility: CLINIC | Age: 39
End: 2024-03-11

## 2024-03-11 NOTE — TELEPHONE ENCOUNTER
Tylenol and ibuprofen was not helping. Pain was at a 7-8 and medication got him down to a 4. Pt was down all weekend from the headache pain. Pt was feeling lethargic, with a lot of pressure above right eye,nauseous and stay in bed all day.     Pt is wondering if there is something else he can take or if he should come back in for another appt.     Best callback [phone]

## 2024-03-13 ENCOUNTER — HOSPITAL ENCOUNTER (EMERGENCY)
Facility: HOSPITAL | Age: 39
Discharge: HOME | End: 2024-03-13
Attending: EMERGENCY MEDICINE | Admitting: EMERGENCY MEDICINE
Payer: COMMERCIAL

## 2024-03-13 VITALS
SYSTOLIC BLOOD PRESSURE: 107 MMHG | HEART RATE: 65 BPM | OXYGEN SATURATION: 98 % | BODY MASS INDEX: 22.96 KG/M2 | RESPIRATION RATE: 18 BRPM | TEMPERATURE: 98.3 F | DIASTOLIC BLOOD PRESSURE: 63 MMHG | WEIGHT: 160 LBS

## 2024-03-13 DIAGNOSIS — G43.811 OTHER MIGRAINE WITH STATUS MIGRAINOSUS, INTRACTABLE: Primary | ICD-10-CM

## 2024-03-13 LAB
ANION GAP SERPL CALCULATED.3IONS-SCNC: 6 MMOL/L (ref 4–13)
BUN SERPL-MCNC: 12 MG/DL (ref 5–25)
CALCIUM SERPL-MCNC: 9.1 MG/DL (ref 8.4–10.2)
CHLORIDE SERPL-SCNC: 104 MMOL/L (ref 96–108)
CO2 SERPL-SCNC: 29 MMOL/L (ref 21–32)
CREAT SERPL-MCNC: 0.9 MG/DL (ref 0.6–1.3)
GFR SERPL CREATININE-BSD FRML MDRD: 108 ML/MIN/1.73SQ M
GLUCOSE SERPL-MCNC: 94 MG/DL (ref 65–140)
POTASSIUM SERPL-SCNC: 3.8 MMOL/L (ref 3.5–5.3)
SODIUM SERPL-SCNC: 139 MMOL/L (ref 135–147)

## 2024-03-13 PROCEDURE — 36415 COLL VENOUS BLD VENIPUNCTURE: CPT | Performed by: EMERGENCY MEDICINE

## 2024-03-13 PROCEDURE — 96374 THER/PROPH/DIAG INJ IV PUSH: CPT

## 2024-03-13 PROCEDURE — 99284 EMERGENCY DEPT VISIT MOD MDM: CPT | Performed by: EMERGENCY MEDICINE

## 2024-03-13 PROCEDURE — 96375 TX/PRO/DX INJ NEW DRUG ADDON: CPT

## 2024-03-13 PROCEDURE — 80048 BASIC METABOLIC PNL TOTAL CA: CPT | Performed by: EMERGENCY MEDICINE

## 2024-03-13 PROCEDURE — 96361 HYDRATE IV INFUSION ADD-ON: CPT

## 2024-03-13 PROCEDURE — 99284 EMERGENCY DEPT VISIT MOD MDM: CPT

## 2024-03-13 RX ORDER — KETOROLAC TROMETHAMINE 30 MG/ML
15 INJECTION, SOLUTION INTRAMUSCULAR; INTRAVENOUS ONCE
Status: COMPLETED | OUTPATIENT
Start: 2024-03-13 | End: 2024-03-13

## 2024-03-13 RX ORDER — METOCLOPRAMIDE HYDROCHLORIDE 5 MG/ML
10 INJECTION INTRAMUSCULAR; INTRAVENOUS ONCE
Status: COMPLETED | OUTPATIENT
Start: 2024-03-13 | End: 2024-03-13

## 2024-03-13 RX ADMIN — SODIUM CHLORIDE 1000 ML: 0.9 INJECTION, SOLUTION INTRAVENOUS at 16:29

## 2024-03-13 RX ADMIN — KETOROLAC TROMETHAMINE 15 MG: 30 INJECTION, SOLUTION INTRAMUSCULAR at 16:31

## 2024-03-13 RX ADMIN — METOCLOPRAMIDE 10 MG: 5 INJECTION, SOLUTION INTRAMUSCULAR; INTRAVENOUS at 16:30

## 2024-03-13 NOTE — ED PROVIDER NOTES
History  Chief Complaint   Patient presents with    Headache     Saturday night pt had sudden onset of severe headache (never had a HA that bad before) accompanied by light sensitivity, nausea, dizziness. Symptoms have persisted since then including increased fatigue. Denies any fever, chest pain, or shortness of breath.     Patient reports a headache that began Saturday night.  He reports before the headache he developed tunnel vision and then felt like he was seeing spots in his eyes.  It made it difficult for him to read because it felt like there was a dark spot in his vision over the screen he was looking at.  He reports over about 5 minutes from there after the visual changes he developed a severe headache.  The headache is associated with light sensitivity and nausea but he has not vomited.  He has been he typically gets headaches about 3 times a week but this was worse than his usual headaches.  They do not usually last this long.  He tried taking Tylenol ibuprofen without significant proved in symptoms.  He notes that he did have an MRI less than 2 weeks ago after seeing his neurologist.  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  He has no family history of aneurysms, and denies ever being told that he has an aneurysm in his extensive neurologic workup.          Prior to Admission Medications   Prescriptions Last Dose Informant Patient Reported? Taking?   LORazepam (Ativan) 0.5 mg tablet   No No   Sig: Take 1 tablet (0.5 mg total) by mouth 30 min pre-procedure   Multiple Vitamin (multivitamin) capsule  Spouse/Significant Other, Self Yes No   Sig: Take 1 capsule by mouth daily   azelastine (ASTELIN) 0.1 % nasal spray  Spouse/Significant Other, Self No No   Si spray into each nostril 2 (two) times a day Use in each nostril as directed   ciclopirox (LOPROX) 0.77 % cream  Spouse/Significant Other, Self No No   Sig: Apply  topically 2 (two) times a day To affected areas of face   clonazePAM (KlonoPIN) 0.5 mg tablet   No No   Sig: TAKE 2 TABLETS (1 MG TOTAL) BY MOUTH DAILY AT BEDTIME      Facility-Administered Medications: None       Past Medical History:   Diagnosis Date    Difficulty walking     HL (hearing loss)     Movement disorder     Neurologic abnormality        History reviewed. No pertinent surgical history.    Family History   Problem Relation Age of Onset    Basal cell carcinoma Mother     Breast cancer Mother     No Known Problems Father     Irritable bowel syndrome Sister     Liver disease Paternal Uncle     Colon cancer Maternal Grandmother      I have reviewed and agree with the history as documented.    E-Cigarette/Vaping    E-Cigarette Use Never User      E-Cigarette/Vaping Substances    Nicotine No     THC No     CBD No     Flavoring No     Other No     Unknown No      Social History     Tobacco Use    Smoking status: Never    Smokeless tobacco: Never   Vaping Use    Vaping status: Never Used   Substance Use Topics    Alcohol use: Never    Drug use: Never       Review of Systems   All other systems reviewed and are negative.      Physical Exam  Physical Exam  Vitals and nursing note reviewed.   Constitutional:       General: He is not in acute distress.     Appearance: He is well-developed.   HENT:      Head: Normocephalic and atraumatic.   Eyes:      Conjunctiva/sclera: Conjunctivae normal.   Cardiovascular:      Rate and Rhythm: Normal rate and regular rhythm.      Heart sounds: No murmur heard.  Pulmonary:      Effort: Pulmonary effort is normal. No respiratory distress.      Breath sounds: Normal breath sounds.   Abdominal:      Palpations: Abdomen is soft.      Tenderness: There is no abdominal tenderness.   Musculoskeletal:         General: No swelling.      Cervical back: Neck supple.   Skin:     General: Skin is warm and dry.      Capillary Refill: Capillary refill takes less than 2 seconds.   Neurological:       General: No focal deficit present.      Mental Status: He is alert.      Cranial Nerves: No cranial nerve deficit.      Comments: Ataxia noted in all 4 extremities.   Psychiatric:         Mood and Affect: Mood normal.         Vital Signs  ED Triage Vitals [03/13/24 1600]   Temperature Pulse Respirations Blood Pressure SpO2   98.3 °F (36.8 °C) 65 18 107/63 98 %      Temp Source Heart Rate Source Patient Position - Orthostatic VS BP Location FiO2 (%)   Oral Monitor -- -- --      Pain Score       3           Vitals:    03/13/24 1600   BP: 107/63   Pulse: 65         Visual Acuity      ED Medications  Medications   sodium chloride 0.9 % bolus 1,000 mL (0 mL Intravenous Stopped 3/13/24 1819)   ketorolac (TORADOL) injection 15 mg (15 mg Intravenous Given 3/13/24 1631)   metoclopramide (REGLAN) injection 10 mg (10 mg Intravenous Given 3/13/24 1630)       Diagnostic Studies  Results Reviewed       Procedure Component Value Units Date/Time    Basic metabolic panel [[id number]] Collected: 03/13/24 1629    Lab Status: Final result Specimen: Blood from Arm, Left Updated: 03/13/24 1655     Sodium 139 mmol/L      Potassium 3.8 mmol/L      Chloride 104 mmol/L      CO2 29 mmol/L      ANION GAP 6 mmol/L      BUN 12 mg/dL      Creatinine 0.90 mg/dL      Glucose 94 mg/dL      Calcium 9.1 mg/dL      eGFR 108 ml/min/1.73sq m     Narrative:      National Kidney Disease Foundation guidelines for Chronic Kidney Disease (CKD):     Stage 1 with normal or high GFR (GFR > 90 mL/min/1.73 square meters)    Stage 2 Mild CKD (GFR = 60-89 mL/min/1.73 square meters)    Stage 3A Moderate CKD (GFR = 45-59 mL/min/1.73 square meters)    Stage 3B Moderate CKD (GFR = 30-44 mL/min/1.73 square meters)    Stage 4 Severe CKD (GFR = 15-29 mL/min/1.73 square meters)    Stage 5 End Stage CKD (GFR <15 mL/min/1.73 square meters)  Note: GFR calculation is accurate only with a steady state creatinine                   No orders to display               Procedures  Procedures         ED Course  ED Course as of 03/13/24 2325   Wed Mar 13, 2024   1723 I reassessed the patient.  His pain level is now down to 1.5 with significant improvement.  Will continue to reassess.   2325 Following medications, patient reports complete resolution of his headache and reports his vision is normal.  I discussed follow-up and indications to return to the emergency department.                               SBIRT 22yo+      Flowsheet Row Most Recent Value   Initial Alcohol Screen: US AUDIT-C     1. How often do you have a drink containing alcohol? 0 Filed at: 03/13/2024 1639   2. How many drinks containing alcohol do you have on a typical day you are drinking?  0 Filed at: 03/13/2024 1639   3a. Male UNDER 65: How often do you have five or more drinks on one occasion? 0 Filed at: 03/13/2024 1639   Audit-C Score 0 Filed at: 03/13/2024 1639   MEHRAN: How many times in the past year have you...    Used an illegal drug or used a prescription medication for non-medical reasons? Never Filed at: 03/13/2024 1639                      Medical Decision Making  I eval the patient who presents with typical aura including scotoma prior to onset of headache that built up fairly gradually over 5 minutes.  He did have negative MRI 10 days ago.  His neuroexam has baseline ataxia, but no new changes.  In light of established history of headaches, negative MRI, extremely low index of suspicion for SAH/bleeding.  Risks of radiation/LP outweigh benefits.  Will medicate and reexamine.    Amount and/or Complexity of Data Reviewed  Labs: ordered.    Risk  Prescription drug management.             Disposition  Final diagnoses:   Other migraine with status migrainosus, intractable     Time reflects when diagnosis was documented in both MDM as applicable and the Disposition within this note       Time User Action Codes Description Comment    3/13/2024  6:09 PM Chivo Beltran Add [G43.811] Other migraine with  status migrainosus, intractable           ED Disposition       ED Disposition   Discharge    Condition   Stable    Date/Time   Wed Mar 13, 2024 1809    Comment   Emilio Simmons discharge to home/self care.                   Follow-up Information       Follow up With Specialties Details Why Contact Info    Ahsan Hilliard MD Neurology   [address] [place] [address] [id number] [phone]              Discharge Medication List as of 3/13/2024  6:10 PM        CONTINUE these medications which have NOT CHANGED    Details   azelastine (ASTELIN) 0.1 % nasal spray 1 spray into each nostril 2 (two) times a day Use in each nostril as directed, Starting Mon 1/22/2024, Normal      ciclopirox (LOPROX) 0.77 % cream Apply topically 2 (two) times a day To affected areas of face, Starting Tue 5/30/2023, Normal      clonazePAM (KlonoPIN) 0.5 mg tablet TAKE 2 TABLETS (1 MG TOTAL) BY MOUTH DAILY AT BEDTIME, Starting Mon 3/4/2024, Until Wed 4/3/2024, Normal      LORazepam (Ativan) 0.5 mg tablet Take 1 tablet (0.5 mg total) by mouth 30 min pre-procedure, Starting Thu 2/15/2024, Normal      Multiple Vitamin (multivitamin) capsule Take 1 capsule by mouth daily, Historical Med             No discharge procedures on file.    PDMP Review         Value Time User    PDMP Reviewed  Yes 2/15/2024  4:31 PM Ahsan Hilliard MD            ED Provider  Electronically Signed by             Chivo Beltran MD  03/13/24 0294

## 2024-04-02 DIAGNOSIS — R25.1 TREMOR: ICD-10-CM

## 2024-04-02 DIAGNOSIS — G11.9 CEREBELLAR ATAXIA (HCC): ICD-10-CM

## 2024-04-02 RX ORDER — CLONAZEPAM 0.5 MG/1
1 TABLET ORAL
Qty: 60 TABLET | Refills: 0 | Status: SHIPPED | OUTPATIENT
Start: 2024-04-02 | End: 2024-05-02

## 2024-05-03 DIAGNOSIS — R25.1 TREMOR: ICD-10-CM

## 2024-05-03 DIAGNOSIS — G11.9 CEREBELLAR ATAXIA (HCC): ICD-10-CM

## 2024-05-03 RX ORDER — CLONAZEPAM 0.5 MG/1
1 TABLET ORAL
Qty: 60 TABLET | Refills: 0 | Status: SHIPPED | OUTPATIENT
Start: 2024-05-03 | End: 2024-06-02

## 2024-06-10 DIAGNOSIS — R25.1 TREMOR: ICD-10-CM

## 2024-06-10 DIAGNOSIS — G11.9 CEREBELLAR ATAXIA (HCC): ICD-10-CM

## 2024-06-10 RX ORDER — CLONAZEPAM 0.5 MG/1
1 TABLET ORAL
Qty: 60 TABLET | Refills: 0 | Status: SHIPPED | OUTPATIENT
Start: 2024-06-10 | End: 2024-07-10

## 2024-07-11 DIAGNOSIS — G11.9 CEREBELLAR ATAXIA (HCC): ICD-10-CM

## 2024-07-11 DIAGNOSIS — R25.1 TREMOR: ICD-10-CM

## 2024-07-15 DIAGNOSIS — G11.9 CEREBELLAR ATAXIA (HCC): ICD-10-CM

## 2024-07-15 DIAGNOSIS — R25.1 TREMOR: ICD-10-CM

## 2024-07-16 RX ORDER — CLONAZEPAM 0.5 MG/1
1 TABLET ORAL
Qty: 60 TABLET | Refills: 2 | Status: SHIPPED | OUTPATIENT
Start: 2024-07-16 | End: 2024-10-14

## 2024-07-16 NOTE — TELEPHONE ENCOUNTER
Medication: clonazepam    Dose/Frequency: 0.5 mg/ at bedtime    Quantity: 60    Pharmacy: [facility]    Office:   [] PCP/Provider -   [x] Speciality/Provider -     Does the patient have enough for 3 days?   [] Yes   [] No - Send as HP to POD

## 2024-07-24 RX ORDER — CLONAZEPAM 0.5 MG/1
1 TABLET ORAL
Qty: 60 TABLET | Refills: 0 | Status: SHIPPED | OUTPATIENT
Start: 2024-07-24 | End: 2024-08-23

## 2024-11-18 DIAGNOSIS — G11.9 CEREBELLAR ATAXIA (HCC): ICD-10-CM

## 2024-11-18 DIAGNOSIS — R25.1 TREMOR: ICD-10-CM

## 2024-11-19 RX ORDER — CLONAZEPAM 0.5 MG/1
1 TABLET ORAL
Qty: 60 TABLET | Refills: 0 | Status: SHIPPED | OUTPATIENT
Start: 2024-11-19 | End: 2024-12-19

## 2024-11-19 NOTE — TELEPHONE ENCOUNTER
Medication:  Klonopin   PDMP  10/17/2024 07/24/2024 clonazePAM (Tablet) 60.0 30 0.5 MG NA [facility] Medicaid 0 / 0 [place]   1 [id number] 09/17/2024 07/16/2024 clonazePAM (Tablet) 60.0 30 0.5 MG NA [facility] Medicaid 2 / 2 [place]   1 [id number] 08/18/2024 07/16/2024 clonazePAM (Tablet) 60.0 30 0.5 MG NA [facility] Medicaid 1 / 2 [place]   1 [id number] 07/16/2024 07/16/2024 clonazePAM (Tablet) 60.0 30 0.5 MG [facility] Medicaid 0 / 2 [place]  Active agreement on file -No       Refill must be reviewed and completed by the office or provider. The refill is unable to be approved or denied by the medication management team.

## 2024-12-17 DIAGNOSIS — R25.1 TREMOR: ICD-10-CM

## 2024-12-17 DIAGNOSIS — G11.9 CEREBELLAR ATAXIA (HCC): ICD-10-CM

## 2024-12-18 RX ORDER — CLONAZEPAM 0.5 MG/1
1 TABLET ORAL
Qty: 60 TABLET | Refills: 0 | Status: SHIPPED | OUTPATIENT
Start: 2024-12-18 | End: 2025-01-17

## 2024-12-18 NOTE — TELEPHONE ENCOUNTER
Medication: Klonopin   PDMP  11/19/2024 11/19/2024 clonazePAM (Tablet) 60.0 30 0.5 MG NA [facility] Medicaid 0 / 0 [place]   1 [id number] 10/17/2024 07/24/2024 clonazePAM (Tablet) 60.0 30 0.5 MG NA [facility] Medicaid 0 / 0 [place]   1 [id number] 09/17/2024 07/16/2024 clonazePAM (Tablet) 60.0 30 0.5 MG NA [facility] Medicaid 2 / 2 [place]   1 [id number] 08/18/2024 07/16/2024 clonazePAM (Tablet) 60.0 30 0.5 MG [facility] Medicaid 1 / 2 [place]  Active agreement on file -No       Refill must be reviewed and completed by the office or provider. The refill is unable to be approved or denied by the medication management team.

## 2025-01-18 DIAGNOSIS — G11.9 CEREBELLAR ATAXIA (HCC): ICD-10-CM

## 2025-01-18 DIAGNOSIS — R25.1 TREMOR: ICD-10-CM

## 2025-01-20 RX ORDER — CLONAZEPAM 0.5 MG/1
1 TABLET ORAL
Qty: 60 TABLET | Refills: 0 | Status: SHIPPED | OUTPATIENT
Start: 2025-01-20 | End: 2025-02-19

## 2025-01-20 NOTE — TELEPHONE ENCOUNTER
Medication: Klonopin   PDMP  12/18/2024 12/18/2024 clonazePAM (Tablet) 60.0 30 0.5 MG [facility] Medicaid 0 / 0 [place]   1 [id number] 11/19/2024 11/19/2024 clonazePAM (Tablet) 60.0 30 0.5 MG [facility] Medicaid 0 / 0 [place]   1 [id number] 10/17/2024 07/24/2024 clonazePAM (Tablet) 60.0 30 0.5 MG [facility] Medicaid 0 / 0 [place]   1 [id number] 09/17/2024 07/16/2024 clonazePAM (Tablet) 60.0 30 0.5 MG [facility] Medicaid 2 / 2 [place]  Active agreement on file -No

## 2025-02-17 DIAGNOSIS — R25.1 TREMOR: ICD-10-CM

## 2025-02-17 DIAGNOSIS — G11.9 CEREBELLAR ATAXIA (HCC): ICD-10-CM

## 2025-02-18 RX ORDER — CLONAZEPAM 0.5 MG/1
1 TABLET ORAL
Qty: 60 TABLET | Refills: 0 | Status: SHIPPED | OUTPATIENT
Start: 2025-02-18 | End: 2025-03-20

## 2025-02-18 NOTE — TELEPHONE ENCOUNTER
Patient Id Prescription # Filled Written Drug Label Qty Days Strength MME** Prescriber Pharmacy Payment REFILL #/Auth State Detail  1 [id number] 01/20/2025 01/20/2025 clonazePAM (Tablet) 60.0 30 0.5 MG [facility] Medicaid 0 / 0 [place]   1 [id number] 12/18/2024 12/18/2024 clonazePAM (Tablet) 60.0 30 0.5 MG [facility] Medicaid 0 / 0 [place]   1 [id number] 11/19/2024 11/19/2024 clonazePAM (Tablet) 60.0 30 0.5 MG [facility] Medicaid 0 / 0 [place]   1 [id number] 10/17/2024 07/24/2024 clonazePAM (Tablet) 60.0 30 0.5 MG [facility] Medicaid 0 / 0 PA   1 [id number] 09/17/2024 07/16/2024 clonazePAM (Tablet) 60.0 30 0.5 MG [facility] / 2 [place]   1 [id number] 08/18/2024 07/16/2024 clonazePAM (Tablet) 60.0 30 0.5 MG [facility] / 2 [place]   1 [id number] 07/16/2024 07/16/2024 clonazePAM (Tablet) 60.0 30 0.5 MG [facility] Medicaid 0 / 2 [place]   1 [id number] 06/10/2024 06/10/2024 clonazePAM (Tablet) 60.0 30 0.5 MG [facility] / 0 [place]   1 [id number] 05/03/2024 05/03/2024 clonazePAM (Tablet) 60.0 30 0.5 MG [facility] / 0 [place]   1 [id number] 04/02/2024 04/02/2024 clonazePAM (Tablet) 60.0 30 0.5 MG NA [facility] Medicaid 0 / 0 [place]

## 2025-02-20 ENCOUNTER — OFFICE VISIT (OUTPATIENT)
Dept: NEUROLOGY | Facility: CLINIC | Age: 40
End: 2025-02-20
Payer: COMMERCIAL

## 2025-02-20 VITALS
TEMPERATURE: 98 F | WEIGHT: 160 LBS | DIASTOLIC BLOOD PRESSURE: 68 MMHG | HEART RATE: 60 BPM | SYSTOLIC BLOOD PRESSURE: 130 MMHG | OXYGEN SATURATION: 99 % | BODY MASS INDEX: 22.96 KG/M2

## 2025-02-20 DIAGNOSIS — G11.9 CEREBELLAR ATAXIA (HCC): Primary | ICD-10-CM

## 2025-02-20 PROCEDURE — 99213 OFFICE O/P EST LOW 20 MIN: CPT | Performed by: PSYCHIATRY & NEUROLOGY

## 2025-02-20 NOTE — PROGRESS NOTES
Name: Emilio Simmons      : 1985      MRN: [id number]  Encounter Provider: Ahsan Hilliard MD  Encounter Date: 2025   Encounter department: NEUROLOGY [facility]  :  Assessment & Plan  Cerebellar ataxia (HCC)  This patient has longstanding history of ataxia, symptoms started in year , when patient was about 23 years of age, he does have history of prior sensorineural hearing loss from age 17.  Symptoms started with a flulike illness, however within a few months he had rapid decline with change in speech, tremors in both upper and lower extremities.  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  Since then, patient never completely recovered, however has not had any decline either.  Continues to have cerebellar symptoms with dysarthria, dysmetria and poor coordination in both upper and lower extremities.    Recent MRI brain was reviewed, study was completely normal, without any cerebellar atrophy.  We had discussed genetic testing for cerebellar ataxia's in the past, unfortunately has been denied.  Similarly C-spine MRI has been denied.  Does not have any family history of neurodegenerative disorders, specifically ataxias.    At this time, considering no decline since , we will continue to provide supportive care.  Did discuss botulinum toxin injections for his head tremor, which he deferred, will continue Klonopin at bedtime which has been helpful for his sleep.  Discussed oral magnesium and vitamin B2 to help with the headaches, he will also try therapeutic massages.    Encouraged him to do stretching exercises on a daily basis, and he will return to follow-up with us in a year.  He has my contact information for any questions or concerns if needed in the interim.               History of Present Illness   HPI   I had the pleasure of  seeing your patient in neurology clinic for neuromuscular follow-up.  As you know, patient is a 39-year-old man with longstanding history of cerebellar ataxia.  He was last evaluated by me in February 2024, now returns for follow-up.    Since last being seen, patient continues to have tremors in his hand, dysarthria, tremors in both upper and lower extremities, which is unchanged.  He denies any significant change in his symptoms.  Denied any pain or paresthesias in upper or lower extremities, vision is okay, swallowing is unaffected, able to eat his meals without any modification.  No coughing or choking spells.  Patient does get tension headaches.  Has history of sensorineural hearing loss, recently received hearing aids which have been helpful.  Sleeping better with the current doses of Klonopin 0.5 to 1 mg at bedtime, tremors and myoclonic movements are not interfering with his sleep.  He continues to remain active throughout the day, able to do all ADLs by himself, mostly relies on his wheelchair for ambulation, does stretching exercises at home, has handlebars, shower chair and all the other adaptive equipment.    Plays video games to spend his time.    Headaches continue to be couple of times a week, mostly mild, will rarely need Tylenol/Motrin.  No photophobia/phonophobia, no nausea or vomiting.    He did have MRI of the brainIn March 2024, images were personally reviewed by me as a part of this evaluation, study was normal, specifically no cerebellar atrophy was noted.    MRI of the cervical spine was denied by the insurance, we had also discussed genetic testing for spinocerebellar ataxia, unfortunately denied by insurance.    He denies any other complaints today.    Prior history from initial evaluation in November 2022:  Briefly, patient is a 37-year-old man who is here to establish care again with a prior diagnosis of cerebellar ataxia.  To note, patient had sensory neural hearing loss at age 17.  When  he was about 23 years of age, in year 2009, patient had a minor flu like illness.  Within a month or 2 afterwards he started noticing change in his speech, which was reported at neck slurred speech, over the next 1 month he started noticing tremors in his head, both upper and lower extremities, with fine motor dysfunction.  He had extensive evaluation at [facility], as well as [facility] at the time.  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  He has not had any evaluation since 2012, was evaluated in [place] at some point for the head tremor, did try botulinum toxin injections which he did not find helpful.  Over the last 10 years, he does not think there has been any decline in his symptoms, he continues to function independently for most activities of daily living.  Continues to have ataxia, tremors including head tremors, poor coordination in upper and lower extremities.  He does not ambulate, relies on his wheelchair for the most part.  He can dress himself.  Handwriting continues to be affected, no swallowing issues, speech continues to be abnormal, no paresthesias in upper or lower extremities, has some urinary urgency, but no accidents, no bowel symptoms.    Review of Systems   Constitutional:  Negative for appetite change, fatigue and fever.   HENT: Negative.  Negative for hearing loss, tinnitus, trouble swallowing and voice change.    Eyes: Negative.  Negative for photophobia, pain and visual disturbance.   Respiratory: Negative.  Negative for shortness of breath.    Cardiovascular: Negative.  Negative for palpitations.   Gastrointestinal: Negative.  Negative for nausea and  vomiting.   Endocrine: Negative.  Negative for cold intolerance.   Genitourinary: Negative.  Negative for dysuria, frequency and urgency.   Musculoskeletal:  Negative for back pain, gait problem, myalgias, neck pain and neck stiffness.   Skin: Negative.  Negative for rash.   Allergic/Immunologic: Negative.    Neurological:  Positive for tremors (generalized constant). Negative for dizziness, seizures, syncope, facial asymmetry, speech difficulty, weakness, light-headedness, numbness and headaches.   Hematological: Negative.  Does not bruise/bleed easily.   Psychiatric/Behavioral: Negative.  Negative for confusion, hallucinations and sleep disturbance.    All other systems reviewed and are negative.   I have personally reviewed the MA's review of systems and made changes as necessary.         Objective   /68 (BP Location: Left arm, Patient Position: Sitting, Cuff Size: Adult)   Pulse 60   Temp 98 °F (36.7 °C) (Temporal)   Wt 72.6 kg (160 lb)   SpO2 99%   BMI 22.96 kg/m²     Physical Exam  On exam, patient was not in any acute distress.  HEENT was unremarkable.  Extremities did not reveal any edema.    Neurological Exam  Neurologically, patient was awake and alert.  Speech was dysarthric, has a scanning quality to his speech.  Had a dystonic tremor in the neck, with head slightly tilted to the right with tightness in the splenius capitis muscles, left worse than the right.  On cranial examination, no ptosis was noted, extraocular movements were slow, no saccadic substitution.  Strength of bilateral muscles was normal.  No facial asymmetry or weakness, tongue was midline.  Tone was slightly reduced in the lower extremities compared to the uppers, strength was normal in all muscle loops in both upper and lower extremities, proximally and distally.  Patient does have dysmetria in both upper and lower extremities, poor fine motor control, no postural or resting tremor was noted.  Finger-to-nose and knee to heel  testing were impaired, reflexes were 2+ throughout, sensation was grossly normal.

## 2025-02-21 NOTE — ASSESSMENT & PLAN NOTE
This patient has longstanding history of ataxia, symptoms started in year 2009, when patient was about 23 years of age, he does have history of prior sensorineural hearing loss from age 17.  Symptoms started with a flulike illness, however within a few months he had rapid decline with change in speech, tremors in both upper and lower extremities.  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  Since then, patient never completely recovered, however has not had any decline either.  Continues to have cerebellar symptoms with dysarthria, dysmetria and poor coordination in both upper and lower extremities.    Recent MRI brain was reviewed, study was completely normal, without any cerebellar atrophy.  We had discussed genetic testing for cerebellar ataxia's in the past, unfortunately has been denied.  Similarly C-spine MRI has been denied.  Does not have any family history of neurodegenerative disorders, specifically ataxias.    At this time, considering no decline since 2010, we will continue to provide supportive care.  Did discuss botulinum toxin injections for his head tremor, which he deferred, will continue Klonopin at bedtime which has been helpful for his sleep.  Discussed oral magnesium and vitamin B2 to help with the headaches, he will also try therapeutic massages.    Encouraged him to do stretching exercises on a daily basis, and he will return to follow-up with us in a year.  He has my contact information for any questions or concerns if needed in the interim.

## 2025-03-18 DIAGNOSIS — G11.9 CEREBELLAR ATAXIA (HCC): ICD-10-CM

## 2025-03-18 DIAGNOSIS — R25.1 TREMOR: ICD-10-CM

## 2025-03-19 RX ORDER — CLONAZEPAM 0.5 MG/1
1 TABLET ORAL
Qty: 60 TABLET | Refills: 0 | Status: SHIPPED | OUTPATIENT
Start: 2025-03-19 | End: 2025-04-18

## 2025-03-19 NOTE — TELEPHONE ENCOUNTER
Medication: Klonopin   PDMP  02/18/2025 02/18/2025 clonazePAM (Tablet) 60.0 30 0.5 MG [facility] Medicaid 0 / 0 [place]   1 [id number] 01/20/2025 01/20/2025 clonazePAM (Tablet) 60.0 30 0.5 MG [facility] Medicaid 0 / 0 [place]   1 [id number] 12/18/2024 12/18/2024 clonazePAM (Tablet) 60.0 30 0.5 MG [facility] Medicaid 0 / 0 [place]   1 [id number] 11/19/2024 11/19/2024 clonazePAM (Tablet) 60.0 30 0.5 MG [facility] Medicaid 0 / 0 [place]  Active agreement on file -No

## 2025-04-17 DIAGNOSIS — G11.9 CEREBELLAR ATAXIA (HCC): ICD-10-CM

## 2025-04-17 DIAGNOSIS — R25.1 TREMOR: ICD-10-CM

## 2025-04-18 RX ORDER — CLONAZEPAM 0.5 MG/1
1 TABLET ORAL
Qty: 60 TABLET | Refills: 0 | Status: SHIPPED | OUTPATIENT
Start: 2025-04-18 | End: 2025-05-18

## 2025-04-18 NOTE — TELEPHONE ENCOUNTER
Medication:Klonopin   PDMP  03/19/2025 03/19/2025 clonazePAM (Tablet) 60.0 30 0.5 MG [facility] Medicaid 0 / 0 [place]   1 [id number] 02/18/2025 02/18/2025 clonazePAM (Tablet) 60.0 30 0.5 MG [facility] Medicaid 0 / 0 [place]   1 [id number] 01/20/2025 01/20/2025 clonazePAM (Tablet) 60.0 30 0.5 MG [facility] Medicaid 0 / 0 [place]   1 [id number] 12/18/2024 12/18/2024 clonazePAM (Tablet) 60.0 30 0.5 MG [facility] Medicaid 0 / 0 [place]  Active agreement on file -No

## 2025-05-19 DIAGNOSIS — G11.9 CEREBELLAR ATAXIA (HCC): ICD-10-CM

## 2025-05-19 DIAGNOSIS — R25.1 TREMOR: ICD-10-CM

## 2025-05-20 RX ORDER — CLONAZEPAM 0.5 MG/1
1 TABLET ORAL
Qty: 60 TABLET | Refills: 0 | Status: SHIPPED | OUTPATIENT
Start: 2025-05-20 | End: 2025-06-19

## 2025-05-20 NOTE — TELEPHONE ENCOUNTER
04/18/2025 04/18/2025 clonazePAM (Tablet) 60.0 30 0.5 MG NA TRAVIS SLOAN [facility] Medicaid 0 / 0 [place]   1 [id number] ** 03/19/2025 03/19/2025 clonazePAM (Tablet) 60.0 30 0.5 MG NA TIM ANDRADE [facility]

## 2025-06-02 ENCOUNTER — OFFICE VISIT (OUTPATIENT)
Dept: DERMATOLOGY | Facility: CLINIC | Age: 40
End: 2025-06-02
Payer: COMMERCIAL

## 2025-06-02 VITALS — TEMPERATURE: 97.7 F

## 2025-06-02 DIAGNOSIS — D18.01 CHERRY ANGIOMA: ICD-10-CM

## 2025-06-02 DIAGNOSIS — L82.1 SEBORRHEIC KERATOSES: Primary | ICD-10-CM

## 2025-06-02 DIAGNOSIS — D48.5 NEOPLASM OF UNCERTAIN BEHAVIOR OF SKIN: ICD-10-CM

## 2025-06-02 DIAGNOSIS — D22.9 MULTIPLE MELANOCYTIC NEVI: ICD-10-CM

## 2025-06-02 DIAGNOSIS — L21.9 SEBORRHEIC DERMATITIS: ICD-10-CM

## 2025-06-02 PROCEDURE — 88341 IMHCHEM/IMCYTCHM EA ADD ANTB: CPT | Performed by: STUDENT IN AN ORGANIZED HEALTH CARE EDUCATION/TRAINING PROGRAM

## 2025-06-02 PROCEDURE — 99214 OFFICE O/P EST MOD 30 MIN: CPT | Performed by: REGISTERED NURSE

## 2025-06-02 PROCEDURE — 88342 IMHCHEM/IMCYTCHM 1ST ANTB: CPT | Performed by: STUDENT IN AN ORGANIZED HEALTH CARE EDUCATION/TRAINING PROGRAM

## 2025-06-02 PROCEDURE — 11102 TANGNTL BX SKIN SINGLE LES: CPT | Performed by: REGISTERED NURSE

## 2025-06-02 PROCEDURE — 88305 TISSUE EXAM BY PATHOLOGIST: CPT | Performed by: STUDENT IN AN ORGANIZED HEALTH CARE EDUCATION/TRAINING PROGRAM

## 2025-06-02 RX ORDER — KETOCONAZOLE 20 MG/G
CREAM TOPICAL
Qty: 60 G | Refills: 0 | Status: SHIPPED | OUTPATIENT
Start: 2025-06-02

## 2025-06-02 NOTE — PROGRESS NOTES
"[facility] Note     Patient Name: Emilio Simmons  Encounter Date: 06/02/2025       Have you been cared for by a [facility] Dermatologist in the last 3 years and, if so, which description applies to you? Yes. I have been here within the last 3 years, and my medical history has NOT changed since that time. I am not of child-bearing potential.     REVIEW OF SYSTEMS:  Have you recently had or currently have any of the following? No changes in my recent health.   PAST MEDICAL HISTORY:  Have you personally ever had or currently have any of the following?  If \"YES,\" then please provide more detail. No changes in my medical history.   HISTORY OF IMMUNOSUPPRESSION: Do you have a history of any of the following:  Systemic Immunosuppression such as Diabetes, Biologic or Immunotherapy, Chemotherapy, Organ Transplantation, Bone Marrow Transplantation or Prednisone?  No     Answering \"YES\" requires the addition of the dotphrase \"IMMUNOSUPPRESSED\" as the first diagnosis of the patient's visit.   FAMILY HISTORY:  Any \"first degree relatives\" (parent, brother, sister, or child) with the following?    No changes in my family's known health.   PATIENT EXPERIENCE:    Do you want the Dermatologist to perform a COMPLETE skin exam today including a clinical examination under the \"bra and underwear\" areas?  Yes  If necessary, do we have your permission to call and leave a detailed message on your Preferred Phone number that includes your specific medical information?  Yes      Allergies[1] Current Medications[2]              Whom besides the patient is providing clinical information about today's encounter?   NO ADDITIONAL HISTORIAN (patient alone provided history)    Physical Exam and Assessment/Plan by Diagnosis:    SEBORRHEIC KERATOSES  - Relevant exam: Scattered over the back are waxy brown to black plaques and papules with stuck on appearance and consistent dermoscopy  - Exam and clinical history consistent with " "seborrheic keratoses  - Counseled that these are benign growths that do not require treatment    MELANOCYTIC NEVI  - Relevant exam: Scattered over the trunk/extremities are homogenously pigmented brown macules and papules. ELM performed and without concerning findings. No outliers unless otherwise noted in today's note  - Exam and clinical history consistent with melanocytic nevi  - Counseled to return to clinic prior to scheduled appointment should any of these lesions change or should any new lesions of concern arise  - Counseled on use of sun protection daily. Reviewed latest FDA sunscreen guidelines, including use of broad spectrum (UVA and UVB blocking) sunscreen or sun protective clothing with SPF 30-50 every 2-3 hours and reapplied after exposure to water    BERRIOS ANGIOMAS  - Relevant exam: Scattered over the scalp, trunk/extremities are red papules  - Exam and clinical history consistent with cherry angiomas  - Educated that these are benign    NEOPLASM OF UNCERTAIN BEHAVIOR OF SKIN    Physical Exam:  (Anatomic Location); (Size and Morphological Description); (Differential Diagnosis):  A: left supraclavicular; 0.5 cm pink papule; DDX: isk vs rule out scc  Pertinent Positives:  Pertinent Negatives:    Additional History of Present Condition:  Patient reported new lesion that is scaly noticed about 1 month ago. States it itches when sweaty.    Assessment and Plan:  I have discussed with the patient that a sample of skin via a \"skin biopsy” would be potentially helpful to further make a specific diagnosis under the microscope.  Based on a thorough discussion of this condition and the management approach to it (including a comprehensive discussion of the known risks, side effects and potential benefits of treatment), the patient (family) agrees to implement the following specific plan:    Procedure:  Skin Biopsy.  After a thorough discussion of treatment options and risk/benefits/alternatives (including but not " limited to local pain, scarring, dyspigmentation, blistering, possible superinfection, and inability to confirm a diagnosis via histopathology), verbal and written consent were obtained and portion of the rash was biopsied for tissue sample.  See below for consent that was obtained from patient and subsequent Procedure Note.    PROCEDURE TANGENTIAL (SHAVE) BIOPSY NOTE:    Performing Physician: Dr. Ramírez  Anatomic Location; Clinical Description with size (cm); Pre-Op Diagnosis:   A: left supraclavicular; 0.5 cm pink papule; DDX: isk vs rule out scc  Post-op diagnosis: Same     Local anesthesia: 1% xylocaine with epi      Topical anesthesia: None    Hemostasis: Aluminum chloride       After obtaining informed consent  at which time there was a discussion about the purpose of biopsy  and low risks of infection and bleeding.  The area was prepped and draped in the usual fashion. Anesthesia was obtained with 1% lidocaine with epinephrine. A shave biopsy to an appropriate sampling depth was obtained by Shave (Dermablade or 15 blade) The resulting wound was covered with surgical ointment and bandaged appropriately.     The patient tolerated the procedure well without complications and was without signs of functional compromise.      Specimen has been sent for review by Dermatopathology.    Standard post-procedure care has been explained and has been included in written form within the patient's copy of Informed Consent.    INFORMED CONSENT DISCUSSION AND POST-OPERATIVE INSTRUCTIONS FOR PATIENT    I.  RATIONALE FOR PROCEDURE  I understand that a skin biopsy allows the Dermatologist to test a lesion or rash under the microscope to obtain a diagnosis.  It usually involves numbing the area with numbing medication and removing a small piece of skin; sometimes the area will be closed with sutures. In this specific procedure, sutures are not usually needed.  If any sutures are placed, then they are usually need to be removed  "in 2 weeks or less.    I understand that my Dermatologist recommends that a skin \"shave\" biopsy be performed today.  A local anesthetic, similar to the kind that a dentist uses when filling a cavity, will be injected with a very small needle into the skin area to be sampled.  The injected skin and tissue underneath \"will go to sleep” and become numb so no pain should be felt afterwards.  An instrument shaped like a tiny \"razor blade\" (shave biopsy instrument) will be used to cut a small piece of tissue and skin from the area so that a sample of tissue can be taken and examined more closely under the microscope.  A slight amount of bleeding will occur, but it will be stopped with direct pressure and a pressure bandage and any other appropriate methods.  I understands that a scar will form where the wound was created.  Surgical ointment will be applied to help protect the wound.  Sutures are not usually needed.    II.  RISKS AND POTENTIAL COMPLICATIONS   I understand the risks and potential complications of a skin biopsy include but are not limited to the following:  Bleeding  Infection  Pain  Scar/keloid  Skin discoloration  Incomplete Removal  Recurrence  Nerve Damage/Numbness/Loss of Function  Allergic Reaction to Anesthesia  Biopsies are diagnostic procedures and based on findings additional treatment or evaluation may be required  Loss or destruction of specimen resulting in no additional findings    My Dermatologist has explained to me the nature of the condition, the nature of the procedure, and the benefits to be reasonably expected compared with alternative approaches.  My Dermatologist has discussed the likelihood of major risks or complications of this procedure including the specific risks listed above, such as bleeding, infection, and scarring/keloid.  I understand that a scar is expected after this procedure.  I understand that my physician cannot predict if the scar will form a \"keloid,\" which extends " "beyond the borders of the wound that is created.  A keloid is a thick, painful, and bumpy scar.  A keloid can be difficult to treat, as it does not always respond well to therapy, which includes injecting cortisone directly into the keloid every few weeks.  While this usually reduces the pain and size of the scar, it does not eliminate it.      I understand that photographs may be taken before and after the procedure.  These will be maintained as part of the medical providers confidential records and may not be made available to me.  I further authorize the medical provider to use the photographs for teaching purposes or to illustrate scientific papers, books, or lectures if in his/her judgment, medical research, education, or science may benefit from its use.    I have had an opportunity to fully inquire about the risks and benefits of this procedure and its alternatives.   I have been given ample time and opportunity to ask questions and to seek a second opinion if I wished to do so.  I acknowledge that there have specifically been no guarantees as to the cosmetic results from the procedure.  I am aware that with any procedure there is always the possibility of an unexpected complication.    III. POST-PROCEDURAL CARE (WHAT YOU WILL NEED TO DO \"AFTER THE BIOPSY\" TO OPTIMIZE HEALING)    Keep the area clean and dry.  Try NOT to remove the bandage or get it wet for the first 24 hours.    Gently clean the area and apply surgical ointment (such as Vaseline petrolatum ointment, which is available \"over the counter\" and not a prescription) to the biopsy site for up to 2 weeks straight.  This acts to protect the wound from the outside world.      Sutures are not usually placed in this procedure.  If any sutures were placed, return for suture removal as instructed (generally 1 week for the face, 2 weeks for the body).      Take Acetaminophen (Tylenol) for discomfort, if no contraindications.  Ibuprofen or aspirin could make " bleeding worse.    Call our office immediately for signs of infection: fever, chills, increased redness, warmth, tenderness, discomfort/pain, or pus or foul smell coming from the wound.    WHAT TO DO IF THERE IS ANY BLEEDING?  If a small amount of bleeding is noticed, place a clean cloth over the area and apply firm pressure for ten minutes.  Check the wound after 10 minutes of direct pressure.  If bleeding persists, try one more time for an additional 10 minutes of direct pressure on the area.  If the bleeding becomes heavier or does not stop after the second attempt, or if you have any other questions about this procedure, then please call your [facility]'s Dermatologist by calling [phone] (SKIN).     I hereby acknowledge that I have reviewed and verified the site with my Dermatologist and have requested and authorized my Dermatologist to proceed with the procedure.      Scribe Attestation      I,:  Gabi Irwin MA am acting as a scribe while in the presence of the attending physician.:       I,:  Ezekiel Ramírez MD personally performed the services described in this documentation    as scribed in my presence.:                    [1] No Known Allergies  [2]   Current Outpatient Medications:     clonazePAM (KlonoPIN) 0.5 mg tablet, TAKE 2 TABLETS (1 MG TOTAL) BY MOUTH DAILY AT BEDTIME, Disp: 60 tablet, Rfl: 0    Multiple Vitamin (multivitamin) capsule, Take 1 capsule by mouth in the morning., Disp: , Rfl:     azelastine (ASTELIN) 0.1 % nasal spray, 1 spray into each nostril 2 (two) times a day Use in each nostril as directed, Disp: 30 mL, Rfl: 0    ciclopirox (LOPROX) 0.77 % cream, Apply topically 2 (two) times a day To affected areas of face, Disp: 15 g, Rfl: 1    clonazePAM (KlonoPIN) 0.5 mg tablet, TAKE 2 TABLETS (1 MG TOTAL) BY MOUTH DAILY AT BEDTIME, Disp: 60 tablet, Rfl: 2    LORazepam (Ativan) 0.5 mg tablet, Take 1 tablet (0.5 mg total) by mouth 30 min pre-procedure, Disp: 4 tablet, Rfl:  0

## 2025-06-04 ENCOUNTER — TELEPHONE (OUTPATIENT)
Dept: NEUROLOGY | Facility: CLINIC | Age: 40
End: 2025-06-04

## 2025-06-04 NOTE — TELEPHONE ENCOUNTER
Nata who is significant other, called back to return missed call to schedule 1 year f/U appt.   Patient is now scheduled 2/19/26 at 4:00 pm with Dr. Hilliard- [facility].     LOV 2/20 /25 with Dr. Dalila DIA   -1 year f/u

## 2025-06-05 PROCEDURE — 88341 IMHCHEM/IMCYTCHM EA ADD ANTB: CPT | Performed by: STUDENT IN AN ORGANIZED HEALTH CARE EDUCATION/TRAINING PROGRAM

## 2025-06-05 PROCEDURE — 88342 IMHCHEM/IMCYTCHM 1ST ANTB: CPT | Performed by: STUDENT IN AN ORGANIZED HEALTH CARE EDUCATION/TRAINING PROGRAM

## 2025-06-05 PROCEDURE — 88305 TISSUE EXAM BY PATHOLOGIST: CPT | Performed by: STUDENT IN AN ORGANIZED HEALTH CARE EDUCATION/TRAINING PROGRAM

## 2025-06-06 ENCOUNTER — RESULTS FOLLOW-UP (OUTPATIENT)
Age: 40
End: 2025-06-06

## 2025-06-19 DIAGNOSIS — G11.9 CEREBELLAR ATAXIA (HCC): ICD-10-CM

## 2025-06-19 DIAGNOSIS — R25.1 TREMOR: ICD-10-CM

## 2025-06-20 RX ORDER — CLONAZEPAM 0.5 MG/1
1 TABLET ORAL
Qty: 60 TABLET | Refills: 0 | Status: SHIPPED | OUTPATIENT
Start: 2025-06-20 | End: 2025-07-20

## 2025-06-20 NOTE — TELEPHONE ENCOUNTER
Refill must be reviewed and completed by the office or provider. The refill is unable to be approved or denied by the medication management team.      05/20/2025 05/20/2025 clonazePAM (Tablet) 60.0 30 0.5 MG NA TIM ANDRADE [facility] Medicaid 0 / 0 [place]   1 [id number] ** 04/18/2025 04/18/2025 clonazePAM (Tablet) 60.0 30 0.5 MG NA TRAVIS SLOAN [facility] Medicaid 0 / 0 [place]   1 [id number] ** 03/19/2025 03/19/2025 clonazePAM (Tablet) 60.0 30 0.5 [facility] / 0 [place]

## 2025-06-23 ENCOUNTER — OFFICE VISIT (OUTPATIENT)
Dept: FAMILY MEDICINE CLINIC | Facility: CLINIC | Age: 40
End: 2025-06-23
Payer: COMMERCIAL

## 2025-06-23 VITALS
SYSTOLIC BLOOD PRESSURE: 122 MMHG | BODY MASS INDEX: 23.82 KG/M2 | RESPIRATION RATE: 16 BRPM | WEIGHT: 166 LBS | HEART RATE: 88 BPM | DIASTOLIC BLOOD PRESSURE: 66 MMHG | OXYGEN SATURATION: 98 %

## 2025-06-23 DIAGNOSIS — G11.9 CEREBELLAR ATAXIA (HCC): ICD-10-CM

## 2025-06-23 DIAGNOSIS — Z13.6 SCREENING FOR CARDIOVASCULAR CONDITION: ICD-10-CM

## 2025-06-23 DIAGNOSIS — Z13.29 SCREENING FOR THYROID DISORDER: ICD-10-CM

## 2025-06-23 DIAGNOSIS — Z11.59 NEED FOR HEPATITIS C SCREENING TEST: ICD-10-CM

## 2025-06-23 DIAGNOSIS — R25.1 TREMOR: ICD-10-CM

## 2025-06-23 DIAGNOSIS — H90.3 SENSORINEURAL HEARING LOSS (SNHL) OF BOTH EARS: ICD-10-CM

## 2025-06-23 DIAGNOSIS — Z23 ENCOUNTER FOR IMMUNIZATION: ICD-10-CM

## 2025-06-23 DIAGNOSIS — Z00.00 ANNUAL PHYSICAL EXAM: Primary | ICD-10-CM

## 2025-06-23 DIAGNOSIS — Z11.4 SCREENING FOR HIV (HUMAN IMMUNODEFICIENCY VIRUS): ICD-10-CM

## 2025-06-23 DIAGNOSIS — Z13.1 SCREENING FOR DIABETES MELLITUS: ICD-10-CM

## 2025-06-23 DIAGNOSIS — Z13.0 SCREENING FOR DEFICIENCY ANEMIA: ICD-10-CM

## 2025-06-23 PROBLEM — R42 LIGHTHEADEDNESS: Status: RESOLVED | Noted: 2021-10-21 | Resolved: 2025-06-23

## 2025-06-23 PROBLEM — G98.8 NEUROLOGICAL DISORDER: Status: RESOLVED | Noted: 2020-10-06 | Resolved: 2025-06-23

## 2025-06-23 PROBLEM — R35.1 NOCTURIA: Status: RESOLVED | Noted: 2022-06-27 | Resolved: 2025-06-23

## 2025-06-23 PROBLEM — R09.81 NASAL CONGESTION: Status: RESOLVED | Noted: 2024-01-22 | Resolved: 2025-06-23

## 2025-06-23 PROBLEM — R51.9 HEADACHE: Status: RESOLVED | Noted: 2021-10-21 | Resolved: 2025-06-23

## 2025-06-23 PROBLEM — R53.1 WEAKNESS: Status: RESOLVED | Noted: 2021-06-23 | Resolved: 2025-06-23

## 2025-06-23 PROCEDURE — 99395 PREV VISIT EST AGE 18-39: CPT

## 2025-06-23 PROCEDURE — 90715 TDAP VACCINE 7 YRS/> IM: CPT

## 2025-06-23 PROCEDURE — 99214 OFFICE O/P EST MOD 30 MIN: CPT

## 2025-06-23 PROCEDURE — 90471 IMMUNIZATION ADMIN: CPT

## 2025-06-23 NOTE — ASSESSMENT & PLAN NOTE
Follows with neurology - stable since 2010. Recommend supportive treatment - Klonopin nightly. Discussed Botox injections and/or B2 for headaches, declining at this time. Last OV in February, will f/u prn.

## 2025-06-23 NOTE — PATIENT INSTRUCTIONS
"Patient Education     Routine physical for adults   The Basics   Written by the doctors and editors at [facility]   What is a physical? -- A physical is a routine visit, or \"check-up,\" with your doctor. You might also hear it called a \"wellness visit\" or \"preventive visit.\"  During each visit, the doctor will:   Ask about your physical and mental health   Ask about your habits, behaviors, and lifestyle   Do an exam   Give you vaccines if needed   Talk to you about any medicines you take   Give advice about your health   Answer your questions  Getting regular check-ups is an important part of taking care of your health. It can help your doctor find and treat any problems you have. But it's also important for preventing health problems.  A routine physical is different from a \"sick visit.\" A sick visit is when you see a doctor because of a health concern or problem. Since physicals are scheduled ahead of time, you can think about what you want to ask the doctor.  How often should I get a physical? -- It depends on your age and health. In general, for people age 21 years and older:   If you are younger than 50 years, you might be able to get a physical every 3 years.   If you are 50 years or older, your doctor might recommend a physical every year.  If you have an ongoing health condition, like diabetes or high blood pressure, your doctor will probably want to see you more often.  What happens during a physical? -- In general, each visit will include:   Physical exam - The doctor or nurse will check your height, weight, heart rate, and blood pressure. They will also look at your eyes and ears. They will ask about how you are feeling and whether you have any symptoms that bother you.   Medicines - It's a good idea to bring a list of all the medicines you take to each doctor visit. Your doctor will talk to you about your medicines and answer any questions. Tell them if you are having any side effects that bother you. You " "should also tell them if you are having trouble paying for any of your medicines.   Habits and behaviors - This includes:   Your diet   Your exercise habits   Whether you smoke, drink alcohol, or use drugs   Whether you are sexually active   Whether you feel safe at home  Your doctor will talk to you about things you can do to improve your health and lower your risk of health problems. They will also offer help and support. For example, if you want to quit smoking, they can give you advice and might prescribe medicines. If you want to improve your diet or get more physical activity, they can help you with this, too.   Lab tests, if needed - The tests you get will depend on your age and situation. For example, your doctor might want to check your:   Cholesterol   Blood sugar   Iron level   Vaccines - The recommended vaccines will depend on your age, health, and what vaccines you already had. Vaccines are very important because they can prevent certain serious or deadly infections.   Discussion of screening - \"Screening\" means checking for diseases or other health problems before they cause symptoms. Your doctor can recommend screening based on your age, risk, and preferences. This might include tests to check for:   Cancer, such as breast, prostate, cervical, ovarian, colorectal, prostate, lung, or skin cancer   Sexually transmitted infections, such as chlamydia and gonorrhea   Mental health conditions like depression and anxiety  Your doctor will talk to you about the different types of screening tests. They can help you decide which screenings to have. They can also explain what the results might mean.   Answering questions - The physical is a good time to ask the doctor or nurse questions about your health. If needed, they can refer you to other doctors or specialists, too.  Adults older than 65 years often need other care, too. As you get older, your doctor will talk to you about:   How to prevent falling at " home   Hearing or vision tests   Memory testing   How to take your medicines safely   Making sure that you have the help and support you need at home  All topics are updated as new evidence becomes available and our peer review process is complete.  This topic retrieved from [employer] on: May 02, 2024.  Topic 760242 Version 1.0  Release: 32.4.3 - C32.122  © 2024 UpToDate, Inc. and/or its affiliates. All rights reserved.  Consumer Information Use and Disclaimer   Disclaimer: This generalized information is a limited summary of diagnosis, treatment, and/or medication information. It is not meant to be comprehensive and should be used as a tool to help the user understand and/or assess potential diagnostic and treatment options. It does NOT include all information about conditions, treatments, medications, side effects, or risks that may apply to a specific patient. It is not intended to be medical advice or a substitute for the medical advice, diagnosis, or treatment of a health care provider based on the health care provider's examination and assessment of a patient's specific and unique circumstances. Patients must speak with a health care provider for complete information about their health, medical questions, and treatment options, including any risks or benefits regarding use of medications. This information does not endorse any treatments or medications as safe, effective, or approved for treating a specific patient. UpToDate, Inc. and its affiliates disclaim any warranty or liability relating to this information or the use thereof.The use of this information is governed by the Terms of Use, available at https://www.[employer] Ram[employer].com/en/know/clinical-effectiveness-terms. 2024© UpToDate, Inc. and its affiliates and/or licensors. All rights reserved.  Copyright   © 2024 Up[employer]Date, Inc. and/or its affiliates. All rights reserved.

## 2025-06-23 NOTE — PROGRESS NOTES
Adult Annual Physical  Name: Emilio Simmons      : 1985      MRN: [id number]  Encounter Provider: JOSE Zuniga  Encounter Date: 2025   Encounter department: [facility]    :  Assessment & Plan  Annual physical exam  Routine labs ordered. Tdap updated, declining HPV vaccine - counseling provided on both. Will f/u in 1 year or sooner as needed.        Cerebellar ataxia, ambulatory dysfunction, dysarthria, cervical dystonia  Follows with neurology - stable since . Recommend supportive treatment - Klonopin nightly. Discussed Botox injections and/or B2 for headaches, declining at this time. Last OV in February, will f/u prn.        Screening for diabetes mellitus  No prior known history, will screen to rule out.  Orders:    Hemoglobin A1C; Future    Screening for cardiovascular condition  Orders:    Hemoglobin A1C; Future  Orders:    Comprehensive metabolic panel; Future    Lipid Panel with Direct LDL reflex; Future    Screening for deficiency anemia  No prior known history, will screen to rule out.  Orders:    CBC and differential; Future    Screening for thyroid disorder  No prior known history, will screen to rule out.  Orders:    TSH, 3rd generation with Free T4 reflex; Future    Encounter for immunization    Orders:    TDAP VACCINE GREATER THAN OR EQUAL TO 6YO IM    Screening for HIV (human immunodeficiency virus)  One-time preventative screening ordered.   Orders:    HIV 1/2 AG/AB w Reflex [facility] for 2 yr old and above; Future    Need for hepatitis C screening test  One-time preventative screening ordered.   Orders:    Hepatitis C Antibody; Future    Sensorineural hearing loss (SNHL) of both ears  Improved with bilateral hearing aids.        Tremor  Stable with nighlty Klonopin.                  Preventive Screenings:  - Diabetes Screening: orders placed  - Cholesterol Screening: orders placed   - Hepatitis C screening: orders placed   - HIV screening: orders placed    - Colon cancer screening: screening not indicated   - Lung cancer screening: screening not indicated   - Prostate cancer screening: screening not indicated     Immunizations:  - Immunizations due: Tdap  - The patient declines recommended vaccines currently despite my recommendations      Counseling/Anticipatory Guidance:  - Alcohol: discussed moderation in alcohol intake and recommendations for healthy alcohol use.   - Drug use: discussed harms of illicit drug use and how it can negatively impact mental/physical health.   - Tobacco use: discussed harms of tobacco use and management options for quitting.   - Dental health: discussed importance of regular tooth brushing, flossing, and dental visits.   - Sexual health: discussed sexually transmitted diseases, partner selection, use of condoms, avoidance of unintended pregnancy, and contraceptive alternatives.   - Diet: discussed recommendations for a healthy/well-balanced diet.   - Exercise: the importance of regular exercise/physical activity was discussed. Recommend exercise 3-5 times per week for at least 30 minutes.   - Injury prevention: discussed safety/seat belts, safety helmets, smoke detectors, carbon monoxide detectors, and smoking near bedding or upholstery.       Depression Screening and Follow-up Plan: Patient was screened for depression during today's encounter. They screened negative with a PHQ-2 score of 0.      History of Present Illness     Adult Annual Physical:  Patient presents for annual physical. 39 year old male presents for annual wellness exam/transfer of care from previous provider who has left the practice. PMH significant for cerebellar ataxia, diagnosed in 2010, follows with Neurology. Condition stable, supportive treatment at this time, prn Klonopin nightly. He recently established with Audiology for hearing aids which have helped with his sensorineural hearing loss. Reports headaches have been minimal - managed with OTC tylenol and  caffeine. Overall, feels condition is stable and offers no major concerns at this time. Denies CP, SOB, palpitations, NVD, abdominal pain, difficulty passing urine, constipation, diarrhea, or fatigue. Is on disability but manages housework and does cooking/cleaning - lives with his partner of 8 years who is the main source of income..     Diet and Physical Activity:  - Diet/Nutrition: no special diet, well balanced diet, consuming 3-5 servings of fruits/vegetables daily, adequate fiber intake and adequate whole grain intake.  - Exercise: moderate cardiovascular exercise, strength training exercises, 1-2 times a week on average, 3-4 times a week on average, less than 30 minutes on average and 30-60 minutes on average.    Depression Screening:  - PHQ-2 Score: 0    General Health:  - Sleep: sleeps well and > 8 hours of sleep on average.  - Hearing: tinnitus and requires use of hearing aids.  - Vision: wears glasses.  - Dental: regular dental visits, brushes teeth twice daily and floss regularly.    /GYN Health:  - Follows with GYN: no.   - History of STDs: no     Health:  - History of STDs: no.   - Urinary symptoms: none.     Advanced Care Planning:  - Has an advanced directive?: no    - Has a durable medical POA?: no    - ACP document given to patient?: no      Review of Systems   Constitutional:  Negative for activity change, appetite change, chills, fatigue and fever.   HENT:  Negative for congestion, ear pain, rhinorrhea, sore throat and trouble swallowing.    Eyes:  Negative for pain and visual disturbance.   Respiratory:  Negative for cough, chest tightness and shortness of breath.    Cardiovascular:  Negative for chest pain and palpitations.   Gastrointestinal:  Negative for abdominal pain, constipation, diarrhea, nausea and vomiting.   Genitourinary:  Negative for difficulty urinating, dysuria, frequency, hematuria and urgency.   Musculoskeletal:  Negative for arthralgias, back pain, myalgias and neck pain.    Skin:  Negative for color change and rash.   Allergic/Immunologic: Negative for environmental allergies and food allergies.   Neurological:  Positive for tremors and weakness (Chronic secondary to cerebellar ataxia, wheelchair bound). Negative for dizziness, seizures, syncope, light-headedness and headaches.   All other systems reviewed and are negative.        Objective   There were no vitals taken for this visit.    Physical Exam  Vitals and nursing note reviewed.   Constitutional:       General: He is not in acute distress.     Appearance: He is well-developed.   HENT:      Head: Normocephalic and atraumatic.      Right Ear: Tympanic membrane, ear canal and external ear normal.      Left Ear: Tympanic membrane, ear canal and external ear normal.      Nose: No congestion or rhinorrhea.      Mouth/Throat:      Mouth: Mucous membranes are moist.     Eyes:      Conjunctiva/sclera: Conjunctivae normal.       Cardiovascular:      Rate and Rhythm: Normal rate and regular rhythm.      Heart sounds: No murmur heard.  Pulmonary:      Effort: Pulmonary effort is normal. No respiratory distress.      Breath sounds: Normal breath sounds.   Abdominal:      General: Abdomen is flat.      Palpations: Abdomen is soft.      Tenderness: There is no abdominal tenderness.     Musculoskeletal:         General: Deformity present. No swelling, tenderness or signs of injury.      Cervical back: Normal range of motion and neck supple.      Right lower leg: No edema.      Left lower leg: No edema.     Skin:     General: Skin is warm and dry.      Capillary Refill: Capillary refill takes less than 2 seconds.     Neurological:      General: No focal deficit present.      Mental Status: He is alert and oriented to person, place, and time.      GCS: GCS eye subscore is 4. GCS verbal subscore is 5. GCS motor subscore is 6.      Motor: Weakness, tremor and abnormal muscle tone present.      Gait: Gait abnormal.      Comments: Chronic lower  extremity weakness and instability secondary to cerebellar ataxia   Psychiatric:         Mood and Affect: Mood normal.         Behavior: Behavior normal.         Thought Content: Thought content normal.         Judgment: Judgment normal.

## 2025-07-04 LAB
ALBUMIN SERPL-MCNC: 4.4 G/DL (ref 3.6–5.1)
ALBUMIN/GLOB SERPL: 2.2 (CALC) (ref 1–2.5)
ALP SERPL-CCNC: 79 U/L (ref 36–130)
ALT SERPL-CCNC: 25 U/L (ref 9–46)
AST SERPL-CCNC: 25 U/L (ref 10–40)
BASOPHILS # BLD AUTO: 49 CELLS/UL (ref 0–200)
BASOPHILS NFR BLD AUTO: 1.2 %
BILIRUB SERPL-MCNC: 1.2 MG/DL (ref 0.2–1.2)
BUN SERPL-MCNC: 8 MG/DL (ref 7–25)
BUN/CREAT SERPL: NORMAL (CALC) (ref 6–22)
CALCIUM SERPL-MCNC: 9.5 MG/DL (ref 8.6–10.3)
CHLORIDE SERPL-SCNC: 101 MMOL/L (ref 98–110)
CHOLEST SERPL-MCNC: 134 MG/DL
CHOLEST/HDLC SERPL: 3 (CALC)
CO2 SERPL-SCNC: 32 MMOL/L (ref 20–32)
CREAT SERPL-MCNC: 1.04 MG/DL (ref 0.6–1.26)
EOSINOPHIL # BLD AUTO: 98 CELLS/UL (ref 15–500)
EOSINOPHIL NFR BLD AUTO: 2.4 %
ERYTHROCYTE [DISTWIDTH] IN BLOOD BY AUTOMATED COUNT: 12.1 % (ref 11–15)
GFR/BSA.PRED SERPLBLD CYS-BASED-ARV: 94 ML/MIN/1.73M2
GLOBULIN SER CALC-MCNC: 2 G/DL (CALC) (ref 1.9–3.7)
GLUCOSE SERPL-MCNC: 81 MG/DL (ref 65–99)
HBA1C MFR BLD: 4.5 %
HCT VFR BLD AUTO: 44.8 % (ref 38.5–50)
HCV AB SERPL QL IA: NORMAL
HDLC SERPL-MCNC: 44 MG/DL
HGB BLD-MCNC: 15.1 G/DL (ref 13.2–17.1)
HIV 1+2 AB+HIV1 P24 AG SERPL QL IA: NORMAL
LDLC SERPL CALC-MCNC: 73 MG/DL (CALC)
LYMPHOCYTES # BLD AUTO: 1792 CELLS/UL (ref 850–3900)
LYMPHOCYTES NFR BLD AUTO: 43.7 %
MCH RBC QN AUTO: 29.2 PG (ref 27–33)
MCHC RBC AUTO-ENTMCNC: 33.7 G/DL (ref 32–36)
MCV RBC AUTO: 86.7 FL (ref 80–100)
MONOCYTES # BLD AUTO: 320 CELLS/UL (ref 200–950)
MONOCYTES NFR BLD AUTO: 7.8 %
NEUTROPHILS # BLD AUTO: 1841 CELLS/UL (ref 1500–7800)
NEUTROPHILS NFR BLD AUTO: 44.9 %
NONHDLC SERPL-MCNC: 90 MG/DL (CALC)
PLATELET # BLD AUTO: 222 THOUSAND/UL (ref 140–400)
PMV BLD REES-ECKER: 10.8 FL (ref 7.5–12.5)
POTASSIUM SERPL-SCNC: 4 MMOL/L (ref 3.5–5.3)
PROT SERPL-MCNC: 6.4 G/DL (ref 6.1–8.1)
RBC # BLD AUTO: 5.17 MILLION/UL (ref 4.2–5.8)
SODIUM SERPL-SCNC: 140 MMOL/L (ref 135–146)
TRIGL SERPL-MCNC: 91 MG/DL
TSH SERPL-ACNC: 1.53 MIU/L (ref 0.4–4.5)
WBC # BLD AUTO: 4.1 THOUSAND/UL (ref 3.8–10.8)

## 2025-07-18 DIAGNOSIS — G11.9 CEREBELLAR ATAXIA (HCC): ICD-10-CM

## 2025-07-18 DIAGNOSIS — R25.1 TREMOR: ICD-10-CM

## 2025-07-21 RX ORDER — CLONAZEPAM 0.5 MG/1
1 TABLET ORAL
Qty: 60 TABLET | Refills: 0 | Status: SHIPPED | OUTPATIENT
Start: 2025-07-21 | End: 2025-08-20

## 2025-08-18 DIAGNOSIS — G11.9 CEREBELLAR ATAXIA (HCC): ICD-10-CM

## 2025-08-18 DIAGNOSIS — R25.1 TREMOR: ICD-10-CM

## 2025-08-19 RX ORDER — CLONAZEPAM 0.5 MG/1
1 TABLET ORAL
Qty: 60 TABLET | Refills: 0 | Status: SHIPPED | OUTPATIENT
Start: 2025-08-19 | End: 2025-09-18